# Patient Record
Sex: MALE | Race: OTHER | HISPANIC OR LATINO | URBAN - METROPOLITAN AREA
[De-identification: names, ages, dates, MRNs, and addresses within clinical notes are randomized per-mention and may not be internally consistent; named-entity substitution may affect disease eponyms.]

---

## 2020-11-29 ENCOUNTER — INPATIENT (INPATIENT)
Facility: HOSPITAL | Age: 60
LOS: 4 days | Discharge: HOME | DRG: 381 | End: 2020-12-04
Attending: GENERAL ACUTE CARE HOSPITAL | Admitting: GENERAL ACUTE CARE HOSPITAL
Payer: COMMERCIAL

## 2020-11-29 VITALS
DIASTOLIC BLOOD PRESSURE: 77 MMHG | RESPIRATION RATE: 16 BRPM | SYSTOLIC BLOOD PRESSURE: 116 MMHG | HEART RATE: 62 BPM | TEMPERATURE: 98 F | OXYGEN SATURATION: 96 %

## 2020-11-29 DIAGNOSIS — Z90.49 ACQUIRED ABSENCE OF OTHER SPECIFIED PARTS OF DIGESTIVE TRACT: Chronic | ICD-10-CM

## 2020-11-29 LAB
ALBUMIN SERPL ELPH-MCNC: 4.2 G/DL — SIGNIFICANT CHANGE UP (ref 3.3–5)
ALP SERPL-CCNC: 80 U/L — SIGNIFICANT CHANGE UP (ref 40–120)
ALT FLD-CCNC: 12 U/L — SIGNIFICANT CHANGE UP (ref 10–45)
ANION GAP SERPL CALC-SCNC: 10 MMOL/L — SIGNIFICANT CHANGE UP (ref 5–17)
ANION GAP SERPL CALC-SCNC: 12 MMOL/L — SIGNIFICANT CHANGE UP (ref 5–17)
APPEARANCE UR: CLEAR — SIGNIFICANT CHANGE UP
APTT BLD: 29.9 SEC — SIGNIFICANT CHANGE UP (ref 27.5–35.5)
AST SERPL-CCNC: 14 U/L — SIGNIFICANT CHANGE UP (ref 10–40)
BASE EXCESS BLDV CALC-SCNC: 5 MMOL/L — SIGNIFICANT CHANGE UP
BASOPHILS # BLD AUTO: 0.05 K/UL — SIGNIFICANT CHANGE UP (ref 0–0.2)
BASOPHILS NFR BLD AUTO: 0.4 % — SIGNIFICANT CHANGE UP (ref 0–2)
BILIRUB SERPL-MCNC: 0.7 MG/DL — SIGNIFICANT CHANGE UP (ref 0.2–1.2)
BILIRUB UR-MCNC: NEGATIVE — SIGNIFICANT CHANGE UP
BLD GP AB SCN SERPL QL: NEGATIVE — SIGNIFICANT CHANGE UP
BUN SERPL-MCNC: 7 MG/DL — SIGNIFICANT CHANGE UP (ref 7–23)
BUN SERPL-MCNC: 9 MG/DL — SIGNIFICANT CHANGE UP (ref 7–23)
CA-I SERPL-SCNC: 1.1 MMOL/L — LOW (ref 1.12–1.3)
CALCIUM SERPL-MCNC: 8.1 MG/DL — LOW (ref 8.4–10.5)
CALCIUM SERPL-MCNC: 9.2 MG/DL — SIGNIFICANT CHANGE UP (ref 8.4–10.5)
CHLORIDE SERPL-SCNC: 100 MMOL/L — SIGNIFICANT CHANGE UP (ref 96–108)
CHLORIDE SERPL-SCNC: 96 MMOL/L — SIGNIFICANT CHANGE UP (ref 96–108)
CO2 SERPL-SCNC: 27 MMOL/L — SIGNIFICANT CHANGE UP (ref 22–31)
CO2 SERPL-SCNC: 32 MMOL/L — HIGH (ref 22–31)
COLOR SPEC: YELLOW — SIGNIFICANT CHANGE UP
CREAT SERPL-MCNC: 0.79 MG/DL — SIGNIFICANT CHANGE UP (ref 0.5–1.3)
CREAT SERPL-MCNC: 0.93 MG/DL — SIGNIFICANT CHANGE UP (ref 0.5–1.3)
DIFF PNL FLD: NEGATIVE — SIGNIFICANT CHANGE UP
EOSINOPHIL # BLD AUTO: 0.12 K/UL — SIGNIFICANT CHANGE UP (ref 0–0.5)
EOSINOPHIL NFR BLD AUTO: 0.9 % — SIGNIFICANT CHANGE UP (ref 0–6)
GAS PNL BLDV: 135 MMOL/L — LOW (ref 138–146)
GAS PNL BLDV: SIGNIFICANT CHANGE UP
GLUCOSE SERPL-MCNC: 102 MG/DL — HIGH (ref 70–99)
GLUCOSE SERPL-MCNC: 121 MG/DL — HIGH (ref 70–99)
GLUCOSE UR QL: NEGATIVE — SIGNIFICANT CHANGE UP
HCO3 BLDV-SCNC: 32 MMOL/L — HIGH (ref 20–27)
HCT VFR BLD CALC: 41.7 % — SIGNIFICANT CHANGE UP (ref 39–50)
HCT VFR BLD CALC: 45.8 % — SIGNIFICANT CHANGE UP (ref 39–50)
HGB BLD-MCNC: 13.7 G/DL — SIGNIFICANT CHANGE UP (ref 13–17)
HGB BLD-MCNC: 15.1 G/DL — SIGNIFICANT CHANGE UP (ref 13–17)
IMM GRANULOCYTES NFR BLD AUTO: 0.3 % — SIGNIFICANT CHANGE UP (ref 0–1.5)
INR BLD: 1.21 — HIGH (ref 0.88–1.16)
KETONES UR-MCNC: NEGATIVE — SIGNIFICANT CHANGE UP
LEUKOCYTE ESTERASE UR-ACNC: NEGATIVE — SIGNIFICANT CHANGE UP
LIDOCAIN IGE QN: 13 U/L — SIGNIFICANT CHANGE UP (ref 7–60)
LYMPHOCYTES # BLD AUTO: 1.06 K/UL — SIGNIFICANT CHANGE UP (ref 1–3.3)
LYMPHOCYTES # BLD AUTO: 7.7 % — LOW (ref 13–44)
MAGNESIUM SERPL-MCNC: 1.9 MG/DL — SIGNIFICANT CHANGE UP (ref 1.6–2.6)
MCHC RBC-ENTMCNC: 29.2 PG — SIGNIFICANT CHANGE UP (ref 27–34)
MCHC RBC-ENTMCNC: 29.6 PG — SIGNIFICANT CHANGE UP (ref 27–34)
MCHC RBC-ENTMCNC: 32.9 GM/DL — SIGNIFICANT CHANGE UP (ref 32–36)
MCHC RBC-ENTMCNC: 33 GM/DL — SIGNIFICANT CHANGE UP (ref 32–36)
MCV RBC AUTO: 88.6 FL — SIGNIFICANT CHANGE UP (ref 80–100)
MCV RBC AUTO: 90.1 FL — SIGNIFICANT CHANGE UP (ref 80–100)
MONOCYTES # BLD AUTO: 0.71 K/UL — SIGNIFICANT CHANGE UP (ref 0–0.9)
MONOCYTES NFR BLD AUTO: 5.2 % — SIGNIFICANT CHANGE UP (ref 2–14)
NEUTROPHILS # BLD AUTO: 11.79 K/UL — HIGH (ref 1.8–7.4)
NEUTROPHILS NFR BLD AUTO: 85.5 % — HIGH (ref 43–77)
NITRITE UR-MCNC: NEGATIVE — SIGNIFICANT CHANGE UP
NRBC # BLD: 0 /100 WBCS — SIGNIFICANT CHANGE UP (ref 0–0)
NRBC # BLD: 0 /100 WBCS — SIGNIFICANT CHANGE UP (ref 0–0)
NT-PROBNP SERPL-SCNC: 70 PG/ML — SIGNIFICANT CHANGE UP (ref 0–300)
PCO2 BLDV: 54 MMHG — HIGH (ref 41–51)
PH BLDV: 7.38 — SIGNIFICANT CHANGE UP (ref 7.32–7.43)
PH UR: 6.5 — SIGNIFICANT CHANGE UP (ref 5–8)
PHOSPHATE SERPL-MCNC: 3.6 MG/DL — SIGNIFICANT CHANGE UP (ref 2.5–4.5)
PLATELET # BLD AUTO: 255 K/UL — SIGNIFICANT CHANGE UP (ref 150–400)
PLATELET # BLD AUTO: 284 K/UL — SIGNIFICANT CHANGE UP (ref 150–400)
PO2 BLDV: 24 MMHG — SIGNIFICANT CHANGE UP
POTASSIUM BLDV-SCNC: 3.5 MMOL/L — SIGNIFICANT CHANGE UP (ref 3.5–4.9)
POTASSIUM SERPL-MCNC: 3.4 MMOL/L — LOW (ref 3.5–5.3)
POTASSIUM SERPL-MCNC: 3.9 MMOL/L — SIGNIFICANT CHANGE UP (ref 3.5–5.3)
POTASSIUM SERPL-SCNC: 3.4 MMOL/L — LOW (ref 3.5–5.3)
POTASSIUM SERPL-SCNC: 3.9 MMOL/L — SIGNIFICANT CHANGE UP (ref 3.5–5.3)
PROT SERPL-MCNC: 7.7 G/DL — SIGNIFICANT CHANGE UP (ref 6–8.3)
PROT UR-MCNC: NEGATIVE MG/DL — SIGNIFICANT CHANGE UP
PROTHROM AB SERPL-ACNC: 14.4 SEC — HIGH (ref 10.6–13.6)
RBC # BLD: 4.63 M/UL — SIGNIFICANT CHANGE UP (ref 4.2–5.8)
RBC # BLD: 5.17 M/UL — SIGNIFICANT CHANGE UP (ref 4.2–5.8)
RBC # FLD: 13.2 % — SIGNIFICANT CHANGE UP (ref 10.3–14.5)
RBC # FLD: 13.2 % — SIGNIFICANT CHANGE UP (ref 10.3–14.5)
RH IG SCN BLD-IMP: POSITIVE — SIGNIFICANT CHANGE UP
RH IG SCN BLD-IMP: POSITIVE — SIGNIFICANT CHANGE UP
SAO2 % BLDV: 42 % — SIGNIFICANT CHANGE UP
SARS-COV-2 RNA SPEC QL NAA+PROBE: SIGNIFICANT CHANGE UP
SODIUM SERPL-SCNC: 137 MMOL/L — SIGNIFICANT CHANGE UP (ref 135–145)
SODIUM SERPL-SCNC: 140 MMOL/L — SIGNIFICANT CHANGE UP (ref 135–145)
SP GR SPEC: <=1.005 — SIGNIFICANT CHANGE UP (ref 1–1.03)
TROPONIN T SERPL-MCNC: <0.01 NG/ML — SIGNIFICANT CHANGE UP (ref 0–0.01)
UROBILINOGEN FLD QL: 0.2 E.U./DL — SIGNIFICANT CHANGE UP
WBC # BLD: 12.57 K/UL — HIGH (ref 3.8–10.5)
WBC # BLD: 13.77 K/UL — HIGH (ref 3.8–10.5)
WBC # FLD AUTO: 12.57 K/UL — HIGH (ref 3.8–10.5)
WBC # FLD AUTO: 13.77 K/UL — HIGH (ref 3.8–10.5)

## 2020-11-29 PROCEDURE — 71045 X-RAY EXAM CHEST 1 VIEW: CPT | Mod: 26,76

## 2020-11-29 PROCEDURE — 99222 1ST HOSP IP/OBS MODERATE 55: CPT

## 2020-11-29 PROCEDURE — 74177 CT ABD & PELVIS W/CONTRAST: CPT | Mod: 26

## 2020-11-29 PROCEDURE — 93010 ELECTROCARDIOGRAM REPORT: CPT

## 2020-11-29 PROCEDURE — 71045 X-RAY EXAM CHEST 1 VIEW: CPT | Mod: 26

## 2020-11-29 PROCEDURE — 99285 EMERGENCY DEPT VISIT HI MDM: CPT | Mod: 25

## 2020-11-29 RX ORDER — PIPERACILLIN AND TAZOBACTAM 4; .5 G/20ML; G/20ML
3.38 INJECTION, POWDER, LYOPHILIZED, FOR SOLUTION INTRAVENOUS EVERY 6 HOURS
Refills: 0 | Status: DISCONTINUED | OUTPATIENT
Start: 2020-11-29 | End: 2020-12-02

## 2020-11-29 RX ORDER — IOHEXOL 300 MG/ML
30 INJECTION, SOLUTION INTRAVENOUS ONCE
Refills: 0 | Status: COMPLETED | OUTPATIENT
Start: 2020-11-29 | End: 2020-11-29

## 2020-11-29 RX ORDER — FLUCONAZOLE 150 MG/1
400 TABLET ORAL EVERY 24 HOURS
Refills: 0 | Status: DISCONTINUED | OUTPATIENT
Start: 2020-11-30 | End: 2020-12-03

## 2020-11-29 RX ORDER — IPRATROPIUM/ALBUTEROL SULFATE 18-103MCG
3 AEROSOL WITH ADAPTER (GRAM) INHALATION EVERY 6 HOURS
Refills: 0 | Status: DISCONTINUED | OUTPATIENT
Start: 2020-11-29 | End: 2020-12-04

## 2020-11-29 RX ORDER — ERYTHROMYCIN BASE 5 MG/GRAM
1 OINTMENT (GRAM) OPHTHALMIC (EYE) EVERY 8 HOURS
Refills: 0 | Status: DISCONTINUED | OUTPATIENT
Start: 2020-11-29 | End: 2020-12-04

## 2020-11-29 RX ORDER — METRONIDAZOLE 500 MG
500 TABLET ORAL ONCE
Refills: 0 | Status: COMPLETED | OUTPATIENT
Start: 2020-11-29 | End: 2020-11-29

## 2020-11-29 RX ORDER — FAMOTIDINE 10 MG/ML
20 INJECTION INTRAVENOUS ONCE
Refills: 0 | Status: COMPLETED | OUTPATIENT
Start: 2020-11-29 | End: 2020-11-29

## 2020-11-29 RX ORDER — DEXTROSE MONOHYDRATE, SODIUM CHLORIDE, AND POTASSIUM CHLORIDE 50; .745; 4.5 G/1000ML; G/1000ML; G/1000ML
1000 INJECTION, SOLUTION INTRAVENOUS
Refills: 0 | Status: DISCONTINUED | OUTPATIENT
Start: 2020-11-29 | End: 2020-11-30

## 2020-11-29 RX ORDER — PIPERACILLIN AND TAZOBACTAM 4; .5 G/20ML; G/20ML
3.38 INJECTION, POWDER, LYOPHILIZED, FOR SOLUTION INTRAVENOUS ONCE
Refills: 0 | Status: COMPLETED | OUTPATIENT
Start: 2020-11-29 | End: 2020-11-29

## 2020-11-29 RX ORDER — MORPHINE SULFATE 50 MG/1
2 CAPSULE, EXTENDED RELEASE ORAL ONCE
Refills: 0 | Status: DISCONTINUED | OUTPATIENT
Start: 2020-11-29 | End: 2020-11-29

## 2020-11-29 RX ORDER — CEFTRIAXONE 500 MG/1
1000 INJECTION, POWDER, FOR SOLUTION INTRAMUSCULAR; INTRAVENOUS ONCE
Refills: 0 | Status: COMPLETED | OUTPATIENT
Start: 2020-11-29 | End: 2020-11-29

## 2020-11-29 RX ORDER — SODIUM CHLORIDE 9 MG/ML
1000 INJECTION INTRAMUSCULAR; INTRAVENOUS; SUBCUTANEOUS ONCE
Refills: 0 | Status: COMPLETED | OUTPATIENT
Start: 2020-11-29 | End: 2020-11-29

## 2020-11-29 RX ORDER — FLUCONAZOLE 150 MG/1
400 TABLET ORAL ONCE
Refills: 0 | Status: COMPLETED | OUTPATIENT
Start: 2020-11-29 | End: 2020-11-29

## 2020-11-29 RX ORDER — PANTOPRAZOLE SODIUM 20 MG/1
40 TABLET, DELAYED RELEASE ORAL EVERY 12 HOURS
Refills: 0 | Status: DISCONTINUED | OUTPATIENT
Start: 2020-11-29 | End: 2020-12-03

## 2020-11-29 RX ORDER — METRONIDAZOLE 500 MG
500 TABLET ORAL EVERY 8 HOURS
Refills: 0 | Status: DISCONTINUED | OUTPATIENT
Start: 2020-11-29 | End: 2020-12-02

## 2020-11-29 RX ADMIN — CEFTRIAXONE 100 MILLIGRAM(S): 500 INJECTION, POWDER, FOR SOLUTION INTRAMUSCULAR; INTRAVENOUS at 05:44

## 2020-11-29 RX ADMIN — Medication 100 MILLIGRAM(S): at 21:52

## 2020-11-29 RX ADMIN — Medication 100 MILLIGRAM(S): at 13:59

## 2020-11-29 RX ADMIN — SODIUM CHLORIDE 1000 MILLILITER(S): 9 INJECTION INTRAMUSCULAR; INTRAVENOUS; SUBCUTANEOUS at 00:52

## 2020-11-29 RX ADMIN — Medication 100 MILLIGRAM(S): at 06:59

## 2020-11-29 RX ADMIN — IOHEXOL 30 MILLILITER(S): 300 INJECTION, SOLUTION INTRAVENOUS at 00:53

## 2020-11-29 RX ADMIN — PANTOPRAZOLE SODIUM 40 MILLIGRAM(S): 20 TABLET, DELAYED RELEASE ORAL at 17:33

## 2020-11-29 RX ADMIN — FAMOTIDINE 20 MILLIGRAM(S): 10 INJECTION INTRAVENOUS at 00:53

## 2020-11-29 RX ADMIN — FLUCONAZOLE 100 MILLIGRAM(S): 150 TABLET ORAL at 06:58

## 2020-11-29 RX ADMIN — PIPERACILLIN AND TAZOBACTAM 200 GRAM(S): 4; .5 INJECTION, POWDER, LYOPHILIZED, FOR SOLUTION INTRAVENOUS at 23:37

## 2020-11-29 RX ADMIN — PIPERACILLIN AND TAZOBACTAM 200 GRAM(S): 4; .5 INJECTION, POWDER, LYOPHILIZED, FOR SOLUTION INTRAVENOUS at 17:14

## 2020-11-29 RX ADMIN — Medication 1 APPLICATION(S): at 21:52

## 2020-11-29 NOTE — CONSULT NOTE ADULT - ASSESSMENT
60M w/ no pMHx presents w/ perforated gastric ulcer    Perforated ulcer: C/w antibiotics for intra-abdominal infection.  No evidence of sepsis at this time.  PPI.  NPO, gastric suction.    Smoker: nicotine patch PRN 60M w/ no pMHx presents w/ perforated gastric ulcer    Perforated ulcer: C/w antibiotics for intra-abdominal infection.  No evidence of sepsis at this time.  PPI.  NPO, gastric suction.    Pre-op clearance: METS > 4 (patient works as a ).  RCRI 1 (intra-peritoneal procedure).  No hx of CVA/MI/DM.  Cr < 2.  Not on medications but has hx of smoking.  CXR clear, EKG appears normal.  No cardio/resp limitations.  Medically optimized for intra-peritoneal procedure.    Smoker: nicotine patch PRN 60M w/ no pMHx presents w/ perforated gastric ulcer    Perforated ulcer: C/w antibiotics for intra-abdominal infection.  No evidence of sepsis at this time.  PPI.  NPO, gastric suction.    Pre-op clearance: METS > 4 (patient works as a ).  RCRI 1 (intra-peritoneal procedure).  No hx of CVA/MI/DM.  Cr < 2.  Please check hba1c and lipid profile to further assess risk.  Not on medications but has hx of smoking.  CXR clear, EKG appears normal.  No cardio/resp limitations.  Medically optimized for intra-peritoneal procedure.      Smoker: nicotine patch PRN

## 2020-11-29 NOTE — ED ADULT NURSE NOTE - NSIMPLEMENTINTERV_GEN_ALL_ED
Implemented All Universal Safety Interventions:  [place] to call system. Call bell, personal items and telephone within reach. Instruct patient to call for assistance. Room bathroom lighting operational. Non-slip footwear when patient is off stretcher. Physically safe environment: no spills, clutter or unnecessary equipment. Stretcher in lowest position, wheels locked, appropriate side rails in place.
(3) no apparent problem

## 2020-11-29 NOTE — PROVIDER CONTACT NOTE (CHANGE IN STATUS NOTIFICATION) - ASSESSMENT
Currently asleep. When waken up pt does not report lightheadedness, dizziness, or complaints of SOB  VSS, refer to flowsheet

## 2020-11-29 NOTE — ED PROVIDER NOTE - PROGRESS NOTE DETAILS
perforated gastric ulcer on CT - surgery consulted accepted to surgical service - recommend ceftriaxone, flagyl and fluconazole

## 2020-11-29 NOTE — ED PROVIDER NOTE - OBJECTIVE STATEMENT
60M no PMH c/o upper abd pain. pt states pain radiates into chest. states pain ongoing for past 5 days. no bowel movement for past 5 days. no vomiting. no fevers. no SOB. no recent travel. no LE swelling. no sick contacts.  pt states pain worse with eating. upon EMS arrival pt hypoxic, however pt 95% on RA in ED.

## 2020-11-29 NOTE — PROGRESS NOTE ADULT - SUBJECTIVE AND OBJECTIVE BOX
Serial Abdominal Exam @ 19:23    S: Pt complains of mild abdominal pain. Denies any increase in pain. Denies N/V. Last BM this AM, stool antigen testing pending next BM. Denies CP, SOB, calf tenderness. Pt just ambulated to bathroom and reports that he has been ambulating today.    O:  T(C): 36.9 (11-29-20 @ 17:42), Max: 36.9 (11-29-20 @ 17:42)  T(F): 98.4 (11-29-20 @ 17:42), Max: 98.4 (11-29-20 @ 17:42)  HR: 56 (11-29-20 @ 17:00) (56 - 56)  BP: 109/62 (11-29-20 @ 17:00) (109/62 - 109/62)  RR: 16 (11-29-20 @ 17:00) (16 - 16)  SpO2: 94% (11-29-20 @ 17:00) (94% - 94%)  Wt(kg): --                        13.7   12.57 )-----------( 255      ( 29 Nov 2020 06:23 )             41.7     11-29    137  |  100  |  7   ----------------------------<  102<H>  3.9   |  27  |  0.79    Ca    8.1<L>      29 Nov 2020 06:23  Phos  3.6     11-29  Mg     1.9     11-29    TPro  7.7  /  Alb  4.2  /  TBili  0.7  /  DBili  x   /  AST  14  /  ALT  12  /  AlkPhos  80  11-29      Gen: NAD, resting comfortably in bed  C/V: pulses present and strong in the b/l upper extremities   Pulm: Nonlabored breathing, no respiratory distress  Abd: soft, nondistended, mildly tender mainly in LUQ and epigastrum  Extrem: WWP, no calf edema, SCDs    Will continue to monitor.    Serial Abdominal Exam @ 19:23    S: Pt complains of mild abdominal pain. Denies any increase in pain. Denies N/V. Last BM this AM, stool antigen testing pending next BM. Denies CP, SOB, calf tenderness. Pt just ambulated to bathroom and reports that he has been ambulating today. NGT in place w dark output, small amounts of clot.    O:  T(C): 36.9 (11-29-20 @ 17:42), Max: 36.9 (11-29-20 @ 17:42)  T(F): 98.4 (11-29-20 @ 17:42), Max: 98.4 (11-29-20 @ 17:42)  HR: 56 (11-29-20 @ 17:00) (56 - 56)  BP: 109/62 (11-29-20 @ 17:00) (109/62 - 109/62)  RR: 16 (11-29-20 @ 17:00) (16 - 16)  SpO2: 94% (11-29-20 @ 17:00) (94% - 94%)  Wt(kg): --                        13.7   12.57 )-----------( 255      ( 29 Nov 2020 06:23 )             41.7     11-29    137  |  100  |  7   ----------------------------<  102<H>  3.9   |  27  |  0.79    Ca    8.1<L>      29 Nov 2020 06:23  Phos  3.6     11-29  Mg     1.9     11-29    TPro  7.7  /  Alb  4.2  /  TBili  0.7  /  DBili  x   /  AST  14  /  ALT  12  /  AlkPhos  80  11-29      Gen: NAD, resting comfortably in bed, NGT w dark output, small amounts of clot  C/V: pulses present and strong in the b/l upper extremities   Pulm: Nonlabored breathing, no respiratory distress  Abd: soft, nondistended, mildly tender mainly in LUQ and epigastrum  Extrem: WWP, no calf edema, SCDs    Will continue to monitor.

## 2020-11-29 NOTE — H&P ADULT - ASSESSMENT
A/P: 59 yo M no PMH/PSH, extensive smoking history presents with contained gastric perforation.     Admit to General Surgery Team 4 Telemetry Dr. Polanco  NPO/IVF  Pain/Nausea control PRN  Ceftriaxone/Flagyl/Fluconazole  AM labs  Plan discussed with chief resident and attending A/P: 61 yo M no PMH/PSH, extensive smoking history presents with contained gastric perforation.     Admit to General Surgery Team 4 Telemetry Dr. Polanco  NPO/IVF  Pain/Nausea control PRN  Ceftriaxone/Flagyl/Fluconazole  AM labs  Plan discussed with chief resident and attending      General Surgery Senior Resident Note  61 yo smoker, h/o prostate cancer, diagnosed with gastric ulcer on recent EGD, presents with epigastric pain x 5 days. Hemodynamically stable. on exam, tender to RUQ, LUQ, epigastrium. Leukocytosis w/ WBC 13. CT concerning for perforation w/ defect on lesser curvature, contained perf in lesser sac, no widespread pneumoperitoneum (4 cm air and debris filled thick walled structure from 1.4 cm defect along lesser curvature of stomach w/ fat infiltration). CT also shows questionable severe narrowing versus occlusion of the proximal SMA branch (series 4 image 31, series 3 image 33) but with immediate reconstitution distally. For contained gastric perforation- plan for NPO for bowel rest, IVF, PPI, IV abx, admit to telemetry, serial abdominal exams.

## 2020-11-29 NOTE — CONSULT NOTE ADULT - SUBJECTIVE AND OBJECTIVE BOX
Patient is a 60y old  Male who presents with a chief complaint of gastric perforation (29 Nov 2020 05:15)    CC/HPI reviewed by me.  Patient reports no medical problems.  +smoker.  No medications at home.  Occasional EtOH, no illicit drug use.  Denies use of NSAIDS.      INTERVAL HPI/OVERNIGHT EVENTS:  [place]      REVIEW OF SYSTEMS:  CONSTITUTIONAL: No fever, weight loss, or fatigue  EYES: No eye pain, visual disturbances, or discharge  ENMT:  No difficulty hearing, tinnitus, vertigo; No sinus or throat pain  NECK: No pain or stiffness  BREASTS: No pain, masses, or nipple discharge  RESPIRATORY: No cough, wheezing, chills or hemoptysis; No shortness of breath  CARDIOVASCULAR: No chest pain, palpitations, dizziness, or leg swelling  GASTROINTESTINAL: +abdominal pain.  GENITOURINARY: No dysuria, frequency, hematuria, or incontinence  NEUROLOGICAL: No headaches, memory loss, loss of strength, numbness, or tremors  SKIN: Genetic/hereditary skin changes  MUSCULOSKELETAL: No joint pain or swelling; No muscle, back, or extremity pain    T(C): 37.2 (11-29-20 @ 09:17), Max: 37.2 (11-29-20 @ 09:17)  HR: 58 (11-29-20 @ 08:15) (58 - 68)  BP: 95/59 (11-29-20 @ 08:15) (95/59 - 116/77)  RR: 18 (11-29-20 @ 08:15) (16 - 19)  SpO2: 92% (11-29-20 @ 08:15) (92% - 100%)  Wt(kg): --Vital Signs Last 24 Hrs  T(C): 37.2 (29 Nov 2020 09:17), Max: 37.2 (29 Nov 2020 09:17)  T(F): 98.9 (29 Nov 2020 09:17), Max: 98.9 (29 Nov 2020 09:17)  HR: 58 (29 Nov 2020 08:15) (58 - 68)  BP: 95/59 (29 Nov 2020 08:15) (95/59 - 116/77)  BP(mean): 73 (29 Nov 2020 08:15) (73 - 77)  RR: 18 (29 Nov 2020 08:15) (16 - 19)  SpO2: 92% (29 Nov 2020 08:15) (92% - 100%)    PHYSICAL EXAM:  GENERAL: NAD, well-groomed, well-developed  HEAD:  Atraumatic, Normocephalic  EYES: EOMI, PERRLA, conjunctiva and sclera clear  ENMT: No tonsillar erythema, exudates, or enlargement; Moist mucous membranes, Good dentition, No lesions  NECK: Supple, No JVD, Normal thyroid  NERVOUS SYSTEM:  Alert & Oriented X3, Good concentration; Motor Strength 5/5 B/L upper and lower extremities; DTRs 2+ intact and symmetric  CHEST/LUNG: Clear to percussion bilaterally; No rales, rhonchi, wheezing, or rubs  HEART: Regular rate and rhythm; No murmurs, rubs, or gallops  ABDOMEN: Soft, mild tenderness at epigastrum, Nondistended; Bowel sounds present; NG tube in place  EXTREMITIES:  2+ Peripheral Pulses, No clubbing, cyanosis, or edema  LYMPH: No lymphadenopathy noted  SKIN: No rashes or lesions    Consultant(s) Notes Reviewed:  [x ] YES  [ ] NO  Care Discussed with Consultants/Other Providers [ x] YES  [ ] NO    LABS:                        13.7   12.57 )-----------( 255      ( 29 Nov 2020 06:23 )             41.7     11-29    137  |  100  |  7   ----------------------------<  102<H>  3.9   |  27  |  0.79    Ca    8.1<L>      29 Nov 2020 06:23  Phos  3.6     11-29  Mg     1.9     11-29    TPro  7.7  /  Alb  4.2  /  TBili  0.7  /  DBili  x   /  AST  14  /  ALT  12  /  AlkPhos  80  11-29    PT/INR - ( 29 Nov 2020 00:49 )   PT: 14.4 sec;   INR: 1.21          PTT - ( 29 Nov 2020 00:49 )  PTT:29.9 sec  Urinalysis Basic - ( 29 Nov 2020 04:46 )    Color: Yellow / Appearance: Clear / SG: <=1.005 / pH: x  Gluc: x / Ketone: NEGATIVE  / Bili: Negative / Urobili: 0.2 E.U./dL   Blood: x / Protein: NEGATIVE mg/dL / Nitrite: NEGATIVE   Leuk Esterase: NEGATIVE / RBC: x / WBC x   Sq Epi: x / Non Sq Epi: x / Bacteria: x      CAPILLARY BLOOD GLUCOSE            Urinalysis Basic - ( 29 Nov 2020 04:46 )    Color: Yellow / Appearance: Clear / SG: <=1.005 / pH: x  Gluc: x / Ketone: NEGATIVE  / Bili: Negative / Urobili: 0.2 E.U./dL   Blood: x / Protein: NEGATIVE mg/dL / Nitrite: NEGATIVE   Leuk Esterase: NEGATIVE / RBC: x / WBC x   Sq Epi: x / Non Sq Epi: x / Bacteria: x        RADIOLOGY & ADDITIONAL TESTS:    Imaging Personally Reviewed:  [ ] YES  [ ] NO

## 2020-11-29 NOTE — ED ADULT TRIAGE NOTE - OTHER COMPLAINTS
pt BIBA from home for abd and midsternal chest pain x5 days, denies any n/v/d, SOB or fevers, reports chronic cough due to smoking that has not changed recently, per EMS initial spo2 was 88% on RA, pt reports CP is constant and stabbing, does not radiate, worsens when eating, does not worsen with activity, pt denies any medical hx also denies any medications

## 2020-11-29 NOTE — ED PROVIDER NOTE - CLINICAL SUMMARY MEDICAL DECISION MAKING FREE TEXT BOX
abd pain radiating into chest, no SOB, no resp distress, lungs clear, speaking in full sentences, no nausea, diffuse pain wtih palpation  -check labs, ekg  -cxr  -CT a/p  -ivf, pepcid, morphine

## 2020-11-29 NOTE — H&P ADULT - NSICDXPASTMEDICALHX_GEN_ALL_CORE_FT
PAST MEDICAL HISTORY:  Prostate cancer      I will START or STAY ON the medications listed below when I get home from the hospital:  None

## 2020-11-29 NOTE — H&P ADULT - HISTORY OF PRESENT ILLNESS
This is a 61 yo M with no PMH, extensive smoking hx (44 year 1PPD), no PSH who presents with 5 day history of acute onset sharp abdominal pain. Pt states that 5 days ago he had a stabbing sensation in epigastric region that radiated to his chest. He states the pain remained constant, without any alleviation. He denies any associated symptoms including nausea, vomiting, sob, fever, chills, cough, URI symptoms. He denies any NSAID use. Of note, pt underwent EGD/colonoscopy ~3 weeks ago that was significant for gastric ulcer that was not bleeding or perforated. He states he was not put on any medication for the ulcer. Colonoscopy was negative.     In ED, pt is afebrile, HDS. Labs are significant for WBC 13.77. CT scan shows [id number] debris and air-filled thick-walled structure arising from 1.4x1.1cm wall defect along lesser curvature, no extravasation of PO contrast, no pneumoperitoneum.

## 2020-11-29 NOTE — H&P ADULT - NSHPLABSRESULTS_GEN_ALL_CORE
15.1   13.77 )-----------( 284      ( 29 Nov 2020 00:49 )             45.8   11-29    140  |  96  |  9   ----------------------------<  121<H>  3.4<L>   |  32<H>  |  0.93    Ca    9.2      29 Nov 2020 00:49    TPro  7.7  /  Alb  4.2  /  TBili  0.7  /  DBili  x   /  AST  14  /  ALT  12  /  AlkPhos  80  11-29  < from: CT Abdomen and Pelvis w/ Oral Cont and w/ IV Cont (11.29.20 @ 03:00) >    EXAM:  CT ABDOMEN AND PELVIS OC IC                          PROCEDURE DATE:  11/29/2020          INTERPRETATION:  CT SCAN OF ABDOMEN AND PELVIS    History: Upper abdominal pain.    Technique: CT scan of abdomen and pelvis was performed from lung bases through symphysis pubis. Axial, sagittal and coronal reformatted images were reviewed.    Contrast:  IV contrast: Optiray 350: 90 ml administered.  Oral contrast: administered.    Comparison: None.    Findings:    Lower chest: Right lower lobe bronchial wall thickening with mucus plugging in the medial lobe and subsegmental atelectasis distally, consistent with small airways disease. Linear atelectasis versus scarring in the right middle lobe.    Liver:  Normal.    Gallbladder: No radiopaque stones.    Spleen:  Normal.    Pancreas:  Normal.    Adrenal glands:  Normal.    Kidneys: No hydronephrosis or nephrolithiasis. Subcentimeter hypodensity in the left mid kidney is too small to characterize.    Adenopathy:  No lymphadenopathy in abdomenor pelvis.    Ascites: Trace scattered ascites.    Gastrointestinal tract: 4 x 3 x 3 cm debris and air-filled thick-walled structure arising from a 1.4 x 1.1 cm wall defect along the lesser curvature of the stomach with surrounding fat infiltration. There is associated submucosal edema along the lesser curvature of the stomach. No pneumoperitoneum or extravasation of enteric contrast. No bowel obstruction. Normal appendix. Moderate colonic stool burden. 17 mm radiopaque linear structure in the distal sigmoid colon may represent a polypectomy clip.    Vessels: Moderate noncalcified and calcified plaque of the infrarenal abdominal aorta. Apparent single celiac-mesenteric trunk with a small caliber SMA arising inferiorly. Questionable severe narrowing versus occlusion of the proximal SMA branch (series 4 image 31, series 3 image 33) but with immediate reconstitution distally.    Pelvic organs: Distended bladder. Mild prostatomegaly.    Soft tissues: Mild anasarca.    Bones: Mild degenerativechanges of the spine.    Impression:  4 cm air and debris-filled thick-walled structure arises from a 1.4 cm defect along the lesser curvature of the stomach with surrounding fat infiltration is consistent with a contained perforation, probably related to gastric ulcer. Trace scattered ascites. No organized collections or free air within the peritoneum.      < end of copied text >

## 2020-11-29 NOTE — H&P ADULT - NSHPPHYSICALEXAM_GEN_ALL_CORE
Vital Signs Last 24 Hrs  T(C): 36.6 (29 Nov 2020 00:09), Max: 36.6 (29 Nov 2020 00:09)  T(F): 97.9 (29 Nov 2020 00:09), Max: 97.9 (29 Nov 2020 00:09)  HR: 62 (29 Nov 2020 00:09) (62 - 62)  BP: 116/77 (29 Nov 2020 00:09) (116/77 - 116/77)  BP(mean): --  RR: 18 (29 Nov 2020 00:40) (16 - 18)  SpO2: 95% (29 Nov 2020 00:40) (95% - 100%)    PE  GEN: NAD, resting comfortably  HEENT: MMM  CV: NSR, reg s1s2  Resp: CTA b/l no W/R/R  Abd: soft, tender to palpation diffusely, no rebound, +involuntary guarding, no distension  Ext: WWP, no calf tenderness or edema. Multiple dark skin lesions evenly distributed on extremities, trunk and face/scalp.   Neuro: no focal deficits

## 2020-11-30 DIAGNOSIS — R07.89 OTHER CHEST PAIN: ICD-10-CM

## 2020-11-30 DIAGNOSIS — N18.2 CHRONIC KIDNEY DISEASE, STAGE 2 (MILD): ICD-10-CM

## 2020-11-30 DIAGNOSIS — D72.829 ELEVATED WHITE BLOOD CELL COUNT, UNSPECIFIED: ICD-10-CM

## 2020-11-30 DIAGNOSIS — K55.1 CHRONIC VASCULAR DISORDERS OF INTESTINE: ICD-10-CM

## 2020-11-30 DIAGNOSIS — R00.1 BRADYCARDIA, UNSPECIFIED: ICD-10-CM

## 2020-11-30 DIAGNOSIS — K25.1 ACUTE GASTRIC ULCER WITH PERFORATION: ICD-10-CM

## 2020-11-30 DIAGNOSIS — F17.210 NICOTINE DEPENDENCE, CIGARETTES, UNCOMPLICATED: ICD-10-CM

## 2020-11-30 LAB
A1C WITH ESTIMATED AVERAGE GLUCOSE RESULT: 5.4 % — SIGNIFICANT CHANGE UP (ref 4–5.6)
ANION GAP SERPL CALC-SCNC: 7 MMOL/L — SIGNIFICANT CHANGE UP (ref 5–17)
BUN SERPL-MCNC: 9 MG/DL — SIGNIFICANT CHANGE UP (ref 7–23)
CALCIUM SERPL-MCNC: 8.6 MG/DL — SIGNIFICANT CHANGE UP (ref 8.4–10.5)
CHLORIDE SERPL-SCNC: 107 MMOL/L — SIGNIFICANT CHANGE UP (ref 96–108)
CHOLEST SERPL-MCNC: 110 MG/DL — SIGNIFICANT CHANGE UP
CO2 SERPL-SCNC: 28 MMOL/L — SIGNIFICANT CHANGE UP (ref 22–31)
CREAT SERPL-MCNC: 1.03 MG/DL — SIGNIFICANT CHANGE UP (ref 0.5–1.3)
CULTURE RESULTS: SIGNIFICANT CHANGE UP
ESTIMATED AVERAGE GLUCOSE: 108 MG/DL — SIGNIFICANT CHANGE UP (ref 68–114)
GLUCOSE SERPL-MCNC: 106 MG/DL — HIGH (ref 70–99)
HCT VFR BLD CALC: 40.5 % — SIGNIFICANT CHANGE UP (ref 39–50)
HCV AB S/CO SERPL IA: 0.06 S/CO — SIGNIFICANT CHANGE UP
HCV AB SERPL-IMP: SIGNIFICANT CHANGE UP
HDLC SERPL-MCNC: 35 MG/DL — LOW
HGB BLD-MCNC: 13.2 G/DL — SIGNIFICANT CHANGE UP (ref 13–17)
LIPID PNL WITH DIRECT LDL SERPL: 60 MG/DL — SIGNIFICANT CHANGE UP
MAGNESIUM SERPL-MCNC: 2 MG/DL — SIGNIFICANT CHANGE UP (ref 1.6–2.6)
MCHC RBC-ENTMCNC: 29.1 PG — SIGNIFICANT CHANGE UP (ref 27–34)
MCHC RBC-ENTMCNC: 32.6 GM/DL — SIGNIFICANT CHANGE UP (ref 32–36)
MCV RBC AUTO: 89.4 FL — SIGNIFICANT CHANGE UP (ref 80–100)
NON HDL CHOLESTEROL: 75 MG/DL — SIGNIFICANT CHANGE UP
NRBC # BLD: 0 /100 WBCS — SIGNIFICANT CHANGE UP (ref 0–0)
PHOSPHATE SERPL-MCNC: 3.5 MG/DL — SIGNIFICANT CHANGE UP (ref 2.5–4.5)
PLATELET # BLD AUTO: 269 K/UL — SIGNIFICANT CHANGE UP (ref 150–400)
POTASSIUM SERPL-MCNC: 4 MMOL/L — SIGNIFICANT CHANGE UP (ref 3.5–5.3)
POTASSIUM SERPL-SCNC: 4 MMOL/L — SIGNIFICANT CHANGE UP (ref 3.5–5.3)
RBC # BLD: 4.53 M/UL — SIGNIFICANT CHANGE UP (ref 4.2–5.8)
RBC # FLD: 13.5 % — SIGNIFICANT CHANGE UP (ref 10.3–14.5)
SODIUM SERPL-SCNC: 142 MMOL/L — SIGNIFICANT CHANGE UP (ref 135–145)
SPECIMEN SOURCE: SIGNIFICANT CHANGE UP
TRIGL SERPL-MCNC: 76 MG/DL — SIGNIFICANT CHANGE UP
WBC # BLD: 9.52 K/UL — SIGNIFICANT CHANGE UP (ref 3.8–10.5)
WBC # FLD AUTO: 9.52 K/UL — SIGNIFICANT CHANGE UP (ref 3.8–10.5)

## 2020-11-30 PROCEDURE — 99233 SBSQ HOSP IP/OBS HIGH 50: CPT | Mod: GC

## 2020-11-30 PROCEDURE — 99232 SBSQ HOSP IP/OBS MODERATE 35: CPT

## 2020-11-30 PROCEDURE — 99406 BEHAV CHNG SMOKING 3-10 MIN: CPT | Mod: GC

## 2020-11-30 RX ORDER — INFLUENZA VIRUS VACCINE 15; 15; 15; 15 UG/.5ML; UG/.5ML; UG/.5ML; UG/.5ML
0.5 SUSPENSION INTRAMUSCULAR ONCE
Refills: 0 | Status: DISCONTINUED | OUTPATIENT
Start: 2020-11-30 | End: 2020-12-04

## 2020-11-30 RX ORDER — SODIUM CHLORIDE 9 MG/ML
1000 INJECTION, SOLUTION INTRAVENOUS
Refills: 0 | Status: DISCONTINUED | OUTPATIENT
Start: 2020-11-30 | End: 2020-12-03

## 2020-11-30 RX ORDER — NICOTINE POLACRILEX 2 MG
1 GUM BUCCAL DAILY
Refills: 0 | Status: DISCONTINUED | OUTPATIENT
Start: 2020-11-30 | End: 2020-12-04

## 2020-11-30 RX ORDER — SODIUM CHLORIDE 9 MG/ML
1000 INJECTION, SOLUTION INTRAVENOUS ONCE
Refills: 0 | Status: COMPLETED | OUTPATIENT
Start: 2020-11-30 | End: 2020-11-30

## 2020-11-30 RX ADMIN — PIPERACILLIN AND TAZOBACTAM 200 GRAM(S): 4; .5 INJECTION, POWDER, LYOPHILIZED, FOR SOLUTION INTRAVENOUS at 23:39

## 2020-11-30 RX ADMIN — Medication 100 MILLIGRAM(S): at 13:02

## 2020-11-30 RX ADMIN — Medication 1 PATCH: at 18:11

## 2020-11-30 RX ADMIN — Medication 1 APPLICATION(S): at 21:58

## 2020-11-30 RX ADMIN — Medication 1 APPLICATION(S): at 15:24

## 2020-11-30 RX ADMIN — PIPERACILLIN AND TAZOBACTAM 200 GRAM(S): 4; .5 INJECTION, POWDER, LYOPHILIZED, FOR SOLUTION INTRAVENOUS at 05:14

## 2020-11-30 RX ADMIN — Medication 1 PATCH: at 13:02

## 2020-11-30 RX ADMIN — Medication 1 APPLICATION(S): at 05:15

## 2020-11-30 RX ADMIN — SODIUM CHLORIDE 250 MILLILITER(S): 9 INJECTION, SOLUTION INTRAVENOUS at 11:39

## 2020-11-30 RX ADMIN — FLUCONAZOLE 100 MILLIGRAM(S): 150 TABLET ORAL at 05:15

## 2020-11-30 RX ADMIN — Medication 100 MILLIGRAM(S): at 05:14

## 2020-11-30 RX ADMIN — PIPERACILLIN AND TAZOBACTAM 200 GRAM(S): 4; .5 INJECTION, POWDER, LYOPHILIZED, FOR SOLUTION INTRAVENOUS at 11:38

## 2020-11-30 RX ADMIN — Medication 100 MILLIGRAM(S): at 21:58

## 2020-11-30 RX ADMIN — PIPERACILLIN AND TAZOBACTAM 200 GRAM(S): 4; .5 INJECTION, POWDER, LYOPHILIZED, FOR SOLUTION INTRAVENOUS at 18:11

## 2020-11-30 RX ADMIN — PANTOPRAZOLE SODIUM 40 MILLIGRAM(S): 20 TABLET, DELAYED RELEASE ORAL at 05:15

## 2020-11-30 RX ADMIN — PANTOPRAZOLE SODIUM 40 MILLIGRAM(S): 20 TABLET, DELAYED RELEASE ORAL at 18:11

## 2020-11-30 NOTE — PROGRESS NOTE ADULT - SUBJECTIVE AND OBJECTIVE BOX
INTERVAL HPI/OVERNIGHT EVENTS: None    Subjective:  Pt has no acute complaints. Reports pain is improved. Denies N/V. Reports some chest pressure, thinks it is a/w NGT. 12 pt ROS is otherwise negative.     VITAL SIGNS:  T(F): 98.3 (11-30-20 @ 09:10)  HR: 46 (11-30-20 @ 08:18)  BP: 108/66 (11-30-20 @ 08:18)  RR: 17 (11-30-20 @ 08:18)  SpO2: 94% (11-30-20 @ 08:18)  Wt(kg): --    PHYSICAL EXAM:    Constitutional: NAD  Eyes: PERRLA  ENMT: NGT  Neck: supple  Respiratory: decreased BS at bases  Cardiovascular: bradycardic, s1s2, no m/r/g  Gastrointestinal: soft, mild TTP  Extremities: wwp  Vascular: + 2 pulses radial  Neurological: AAO x 4  Musculoskeletal: no joint swelling  Skin: no rash  Psych: normal affect    MEDICATIONS  (STANDING):  dextrose 5% + lactated ringers. 1000 milliLiter(s) (120 mL/Hr) IV Continuous <Continuous>  erythromycin   Ointment 1 Application(s) Both EYES every 8 hours  fluconAZOLE IVPB 400 milliGRAM(s) IV Intermittent every 24 hours  influenza   Vaccine 0.5 milliLiter(s) IntraMuscular once  metroNIDAZOLE  IVPB 500 milliGRAM(s) IV Intermittent every 8 hours  nicotine -  14 mG/24Hr(s) Patch 1 patch Transdermal daily  pantoprazole  Injectable 40 milliGRAM(s) IV Push every 12 hours  piperacillin/tazobactam IVPB.. 3.375 Gram(s) IV Intermittent every 6 hours    MEDICATIONS  (PRN):  albuterol/ipratropium for Nebulization 3 milliLiter(s) Nebulizer every 6 hours PRN Shortness of Breath and/or Wheezing      Allergies    No Known Allergies    Intolerances        LABS:                        13.2   9.52  )-----------( 269      ( 30 Nov 2020 05:52 )             40.5     11-30    142  |  107  |  9   ----------------------------<  106<H>  4.0   |  28  |  1.03    Ca    8.6      30 Nov 2020 05:52  Phos  3.5     11-30  Mg     2.0     11-30    TPro  7.7  /  Alb  4.2  /  TBili  0.7  /  DBili  x   /  AST  14  /  ALT  12  /  AlkPhos  80  11-29    PT/INR - ( 29 Nov 2020 00:49 )   PT: 14.4 sec;   INR: 1.21          PTT - ( 29 Nov 2020 00:49 )  PTT:29.9 sec  Urinalysis Basic - ( 29 Nov 2020 04:46 )    Color: Yellow / Appearance: Clear / SG: <=1.005 / pH: x  Gluc: x / Ketone: NEGATIVE  / Bili: Negative / Urobili: 0.2 E.U./dL   Blood: x / Protein: NEGATIVE mg/dL / Nitrite: NEGATIVE   Leuk Esterase: NEGATIVE / RBC: x / WBC x   Sq Epi: x / Non Sq Epi: x / Bacteria: x        RADIOLOGY & ADDITIONAL TESTS:  < from: CT Abdomen and Pelvis w/ Oral Cont and w/ IV Cont (11.29.20 @ 03:00) >  Lower chest: Right lower lobe bronchial wall thickening with mucus plugging in the medial lobe and subsegmental atelectasis distally, consistent with small airways disease. Linear atelectasis versus scarring in the right middle lobe.    < end of copied text >  < from: CT Abdomen and Pelvis w/ Oral Cont and w/ IV Cont (11.29.20 @ 03:00) >  Impression:  4 cm air and debris-filled thick-walled structure arises from a 1.4 cm defect along the lesser curvature of the stomach with surrounding fat infiltration is consistent with a contained perforation, probably related to gastric ulcer. Trace scattered ascites. No organized collections or free air within the peritoneum.    < end of copied text >  < from: CT Abdomen and Pelvis w/ Oral Cont and w/ IV Cont (11.29.20 @ 03:00) >  Vessels: Moderate noncalcified and calcified plaque of the infrarenal abdominal aorta. Apparent single celiac-mesenteric trunk with a small caliber SMA arising inferiorly. Questionable severe narrowing versus occlusion of the proximal SMA branch (series 4 image 31, series 3 image 33) but with immediate reconstitution distally.    < end of copied text >

## 2020-11-30 NOTE — PROGRESS NOTE ADULT - PROBLEM SELECTOR PLAN 1
Management as per primary team  -Currently medical therapy  -Pending H. Pylori sample  -Obtain collateral regarding possible H. Pylori sample

## 2020-11-30 NOTE — PROGRESS NOTE ADULT - ASSESSMENT
61 yo M no PMH/PSH, extensive smoking history (44yrs ppd) presents with contained gastric perforation.     NPO/IVF  Fluconazole, zosyn, and flagyl  F/u stool antigen  Protonix BID  Pain/Nausea control PRN  AM labs  NGT in [facility]IS  VTE PPX with SCDs, no chemical ppx in setting of GIB

## 2020-11-30 NOTE — PROGRESS NOTE ADULT - SUBJECTIVE AND OBJECTIVE BOX
SUBJECTIVE: Seen and evaluated this AM. No acute events overnight. Resting comfortably in bed. Endorses some abdominal pain, though well controlled with current analgesics. Denies and nausea. NG to LIWS with bright red frothy output. Denies any BMs since admission. Denies f/c, CP, SOB, melena, hematochezia, weakness or pain in extremities.     fluconAZOLE IVPB 400 milliGRAM(s) IV Intermittent every 24 hours  metroNIDAZOLE  IVPB 500 milliGRAM(s) IV Intermittent every 8 hours  piperacillin/tazobactam IVPB.. 3.375 Gram(s) IV Intermittent every 6 hours      Vital Signs Last 24 Hrs  T(C): 36.8 (30 Nov 2020 05:04), Max: 37.2 (29 Nov 2020 09:17)  T(F): 98.2 (30 Nov 2020 05:04), Max: 98.9 (29 Nov 2020 09:17)  HR: 50 (30 Nov 2020 04:15) (46 - 58)  BP: 100/59 (30 Nov 2020 04:15) (95/59 - 109/62)  BP(mean): 71 (30 Nov 2020 04:15) (71 - 81)  RR: 16 (30 Nov 2020 04:15) (16 - 18)  SpO2: 95% (30 Nov 2020 04:15) (92% - 95%)  I&O's Detail    29 Nov 2020 07:01  -  30 Nov 2020 07:00  --------------------------------------------------------  IN:    dextrose 5% + lactated ringers w/ Additives: 1440 mL  Total IN: 1440 mL    OUT:    Nasogastric/Oral tube (mL): 1650 mL    Voided (mL): 1390 mL  Total OUT: 3040 mL    Total NET: -1600 mL      General: NAD, resting comfortably in bed  C/V: NSR  Neck: no neck or supraclavicular LAD  Pulm: Nonlabored breathing, no respiratory distress  Abd: soft, nondistended, mildly TTP in the epigastrium. No rebound or guarding. No periumbilical LAD.  Extrem: WWP, no edema, SCDs in place        LABS:                        13.2   9.52  )-----------( 269      ( 30 Nov 2020 05:52 )             40.5     11-30    142  |  107  |  9   ----------------------------<  106<H>  4.0   |  28  |  1.03    Ca    8.6      30 Nov 2020 05:52  Phos  3.5     11-30  Mg     2.0     11-30    TPro  7.7  /  Alb  4.2  /  TBili  0.7  /  DBili  x   /  AST  14  /  ALT  12  /  AlkPhos  80  11-29    PT/INR - ( 29 Nov 2020 00:49 )   PT: 14.4 sec;   INR: 1.21          PTT - ( 29 Nov 2020 00:49 )  PTT:29.9 sec  Urinalysis Basic - ( 29 Nov 2020 04:46 )    Color: Yellow / Appearance: Clear / SG: <=1.005 / pH: x  Gluc: x / Ketone: NEGATIVE  / Bili: Negative / Urobili: 0.2 E.U./dL   Blood: x / Protein: NEGATIVE mg/dL / Nitrite: NEGATIVE   Leuk Esterase: NEGATIVE / RBC: x / WBC x   Sq Epi: x / Non Sq Epi: x / Bacteria: x

## 2020-11-30 NOTE — PROVIDER CONTACT NOTE (OTHER) - ASSESSMENT
pt currently alert laying in bed watching TV. pt responded to having chest pain due to NGT but denies any radiating pain to the arm, pt denies SOB and numbness or tingling of arms and legs. /69, HR 54, RR 16, SPO2 93% on RA

## 2020-11-30 NOTE — PROGRESS NOTE ADULT - SUBJECTIVE AND OBJECTIVE BOX
Serial Abdominal Exam @ 00:00    S: Pt reports that his abdominal pain is improving. Denies N/V. Still no BM, stool antigen test pending. NGT output with no clots, dark brown appearing. Denies CP, SOB, calf tenderness. Nurse at bedside.    O:  T(C): 37.1 (11-29-20 @ 22:10), Max: 37.1 (11-29-20 @ 22:10)  T(F): 98.7 (11-29-20 @ 22:10), Max: 98.7 (11-29-20 @ 22:10)  HR: 53 (11-29-20 @ 20:19) (53 - 53)  BP: 104/63 (11-29-20 @ 20:19) (104/63 - 104/63)  RR: 17 (11-29-20 @ 20:19) (17 - 17)  SpO2: 93% (11-29-20 @ 20:19) (93% - 93%)  Wt(kg): --                        13.7   12.57 )-----------( 255      ( 29 Nov 2020 06:23 )             41.7     11-29    137  |  100  |  7   ----------------------------<  102<H>  3.9   |  27  |  0.79    Ca    8.1<L>      29 Nov 2020 06:23  Phos  3.6     11-29  Mg     1.9     11-29    TPro  7.7  /  Alb  4.2  /  TBili  0.7  /  DBili  x   /  AST  14  /  ALT  12  /  AlkPhos  80  11-29      Gen: NAD, resting comfortably in bed, NGT in place w dark brown output  C/V: pulses present and strong in the b/l upper extremities   Pulm: Nonlabored breathing, no respiratory distress  Abd: soft, nondistended, mildly tender to palpation of epigastrum and LUQ  Extrem: WWP, no calf edema, SCDs in place     Will continue to monitor.

## 2020-12-01 LAB
ANION GAP SERPL CALC-SCNC: 11 MMOL/L — SIGNIFICANT CHANGE UP (ref 5–17)
ANION GAP SERPL CALC-SCNC: 11 MMOL/L — SIGNIFICANT CHANGE UP (ref 5–17)
APTT BLD: 25.5 SEC — LOW (ref 27.5–35.5)
BLD GP AB SCN SERPL QL: NEGATIVE — SIGNIFICANT CHANGE UP
BUN SERPL-MCNC: 7 MG/DL — SIGNIFICANT CHANGE UP (ref 7–23)
BUN SERPL-MCNC: 8 MG/DL — SIGNIFICANT CHANGE UP (ref 7–23)
CALCIUM SERPL-MCNC: 8.4 MG/DL — SIGNIFICANT CHANGE UP (ref 8.4–10.5)
CALCIUM SERPL-MCNC: 8.6 MG/DL — SIGNIFICANT CHANGE UP (ref 8.4–10.5)
CHLORIDE SERPL-SCNC: 104 MMOL/L — SIGNIFICANT CHANGE UP (ref 96–108)
CHLORIDE SERPL-SCNC: 104 MMOL/L — SIGNIFICANT CHANGE UP (ref 96–108)
CO2 SERPL-SCNC: 26 MMOL/L — SIGNIFICANT CHANGE UP (ref 22–31)
CO2 SERPL-SCNC: 27 MMOL/L — SIGNIFICANT CHANGE UP (ref 22–31)
CREAT SERPL-MCNC: 1 MG/DL — SIGNIFICANT CHANGE UP (ref 0.5–1.3)
CREAT SERPL-MCNC: 1.03 MG/DL — SIGNIFICANT CHANGE UP (ref 0.5–1.3)
GLUCOSE BLDC GLUCOMTR-MCNC: 130 MG/DL — HIGH (ref 70–99)
GLUCOSE SERPL-MCNC: 107 MG/DL — HIGH (ref 70–99)
GLUCOSE SERPL-MCNC: 111 MG/DL — HIGH (ref 70–99)
HCT VFR BLD CALC: 39.9 % — SIGNIFICANT CHANGE UP (ref 39–50)
HCT VFR BLD CALC: 44.8 % — SIGNIFICANT CHANGE UP (ref 39–50)
HGB BLD-MCNC: 13 G/DL — SIGNIFICANT CHANGE UP (ref 13–17)
HGB BLD-MCNC: 14.6 G/DL — SIGNIFICANT CHANGE UP (ref 13–17)
INR BLD: 1.21 — HIGH (ref 0.88–1.16)
LACTATE SERPL-SCNC: 1.7 MMOL/L — SIGNIFICANT CHANGE UP (ref 0.5–2)
MAGNESIUM SERPL-MCNC: 2 MG/DL — SIGNIFICANT CHANGE UP (ref 1.6–2.6)
MCHC RBC-ENTMCNC: 29.2 PG — SIGNIFICANT CHANGE UP (ref 27–34)
MCHC RBC-ENTMCNC: 29.5 PG — SIGNIFICANT CHANGE UP (ref 27–34)
MCHC RBC-ENTMCNC: 32.6 GM/DL — SIGNIFICANT CHANGE UP (ref 32–36)
MCHC RBC-ENTMCNC: 32.6 GM/DL — SIGNIFICANT CHANGE UP (ref 32–36)
MCV RBC AUTO: 89.6 FL — SIGNIFICANT CHANGE UP (ref 80–100)
MCV RBC AUTO: 90.5 FL — SIGNIFICANT CHANGE UP (ref 80–100)
NRBC # BLD: 0 /100 WBCS — SIGNIFICANT CHANGE UP (ref 0–0)
NRBC # BLD: 0 /100 WBCS — SIGNIFICANT CHANGE UP (ref 0–0)
PHOSPHATE SERPL-MCNC: 3.7 MG/DL — SIGNIFICANT CHANGE UP (ref 2.5–4.5)
PLATELET # BLD AUTO: 242 K/UL — SIGNIFICANT CHANGE UP (ref 150–400)
PLATELET # BLD AUTO: 297 K/UL — SIGNIFICANT CHANGE UP (ref 150–400)
POTASSIUM SERPL-MCNC: 3.6 MMOL/L — SIGNIFICANT CHANGE UP (ref 3.5–5.3)
POTASSIUM SERPL-MCNC: 3.7 MMOL/L — SIGNIFICANT CHANGE UP (ref 3.5–5.3)
POTASSIUM SERPL-SCNC: 3.6 MMOL/L — SIGNIFICANT CHANGE UP (ref 3.5–5.3)
POTASSIUM SERPL-SCNC: 3.7 MMOL/L — SIGNIFICANT CHANGE UP (ref 3.5–5.3)
PROTHROM AB SERPL-ACNC: 14.4 SEC — HIGH (ref 10.6–13.6)
RBC # BLD: 4.41 M/UL — SIGNIFICANT CHANGE UP (ref 4.2–5.8)
RBC # BLD: 5 M/UL — SIGNIFICANT CHANGE UP (ref 4.2–5.8)
RBC # FLD: 13.3 % — SIGNIFICANT CHANGE UP (ref 10.3–14.5)
RBC # FLD: 13.4 % — SIGNIFICANT CHANGE UP (ref 10.3–14.5)
RH IG SCN BLD-IMP: POSITIVE — SIGNIFICANT CHANGE UP
SODIUM SERPL-SCNC: 141 MMOL/L — SIGNIFICANT CHANGE UP (ref 135–145)
SODIUM SERPL-SCNC: 142 MMOL/L — SIGNIFICANT CHANGE UP (ref 135–145)
WBC # BLD: 10.74 K/UL — HIGH (ref 3.8–10.5)
WBC # BLD: 11.38 K/UL — HIGH (ref 3.8–10.5)
WBC # FLD AUTO: 10.74 K/UL — HIGH (ref 3.8–10.5)
WBC # FLD AUTO: 11.38 K/UL — HIGH (ref 3.8–10.5)

## 2020-12-01 PROCEDURE — 74240 X-RAY XM UPR GI TRC 1CNTRST: CPT | Mod: 26

## 2020-12-01 PROCEDURE — 99233 SBSQ HOSP IP/OBS HIGH 50: CPT

## 2020-12-01 PROCEDURE — 99232 SBSQ HOSP IP/OBS MODERATE 35: CPT

## 2020-12-01 RX ORDER — SODIUM CHLORIDE 9 MG/ML
1000 INJECTION, SOLUTION INTRAVENOUS ONCE
Refills: 0 | Status: COMPLETED | OUTPATIENT
Start: 2020-12-01 | End: 2020-12-01

## 2020-12-01 RX ORDER — POTASSIUM CHLORIDE 20 MEQ
10 PACKET (EA) ORAL
Refills: 0 | Status: COMPLETED | OUTPATIENT
Start: 2020-12-01 | End: 2020-12-01

## 2020-12-01 RX ORDER — SODIUM CHLORIDE 9 MG/ML
1000 INJECTION, SOLUTION INTRAVENOUS ONCE
Refills: 0 | Status: DISCONTINUED | OUTPATIENT
Start: 2020-12-01 | End: 2020-12-01

## 2020-12-01 RX ADMIN — Medication 1 APPLICATION(S): at 14:02

## 2020-12-01 RX ADMIN — Medication 100 MILLIEQUIVALENT(S): at 14:02

## 2020-12-01 RX ADMIN — Medication 3 MILLILITER(S): at 00:23

## 2020-12-01 RX ADMIN — Medication 100 MILLIGRAM(S): at 23:39

## 2020-12-01 RX ADMIN — PANTOPRAZOLE SODIUM 40 MILLIGRAM(S): 20 TABLET, DELAYED RELEASE ORAL at 17:03

## 2020-12-01 RX ADMIN — Medication 1 APPLICATION(S): at 05:47

## 2020-12-01 RX ADMIN — PIPERACILLIN AND TAZOBACTAM 200 GRAM(S): 4; .5 INJECTION, POWDER, LYOPHILIZED, FOR SOLUTION INTRAVENOUS at 16:25

## 2020-12-01 RX ADMIN — Medication 100 MILLIEQUIVALENT(S): at 10:47

## 2020-12-01 RX ADMIN — Medication 1 APPLICATION(S): at 22:34

## 2020-12-01 RX ADMIN — PANTOPRAZOLE SODIUM 40 MILLIGRAM(S): 20 TABLET, DELAYED RELEASE ORAL at 05:47

## 2020-12-01 RX ADMIN — SODIUM CHLORIDE 1000 MILLILITER(S): 9 INJECTION, SOLUTION INTRAVENOUS at 07:35

## 2020-12-01 RX ADMIN — Medication 1 PATCH: at 18:00

## 2020-12-01 RX ADMIN — Medication 1 PATCH: at 07:23

## 2020-12-01 RX ADMIN — Medication 1 PATCH: at 11:32

## 2020-12-01 RX ADMIN — PIPERACILLIN AND TAZOBACTAM 200 GRAM(S): 4; .5 INJECTION, POWDER, LYOPHILIZED, FOR SOLUTION INTRAVENOUS at 05:47

## 2020-12-01 RX ADMIN — PIPERACILLIN AND TAZOBACTAM 200 GRAM(S): 4; .5 INJECTION, POWDER, LYOPHILIZED, FOR SOLUTION INTRAVENOUS at 22:34

## 2020-12-01 RX ADMIN — SODIUM CHLORIDE 120 MILLILITER(S): 9 INJECTION, SOLUTION INTRAVENOUS at 10:25

## 2020-12-01 RX ADMIN — Medication 100 MILLIEQUIVALENT(S): at 09:42

## 2020-12-01 RX ADMIN — PIPERACILLIN AND TAZOBACTAM 200 GRAM(S): 4; .5 INJECTION, POWDER, LYOPHILIZED, FOR SOLUTION INTRAVENOUS at 11:54

## 2020-12-01 RX ADMIN — Medication 100 MILLIGRAM(S): at 08:29

## 2020-12-01 RX ADMIN — FLUCONAZOLE 100 MILLIGRAM(S): 150 TABLET ORAL at 05:47

## 2020-12-01 RX ADMIN — Medication 100 MILLIGRAM(S): at 16:24

## 2020-12-01 NOTE — PROGRESS NOTE ADULT - PROBLEM SELECTOR PLAN 1
Management as per primary team  -Currently medical therapy, IV PPI BID  -C/w Abx for now: zosyn/fluconazole, probably no need to c/w flagyl as zosyn has anaerobic coverage  -F/up UGI contrast study being done today.  -Prior EGD back in 9/30/2020 was negative for H.pylori/showed antral gastritis.

## 2020-12-01 NOTE — PROGRESS NOTE ADULT - SUBJECTIVE AND OBJECTIVE BOX
SUBJECTIVE: Patient seen and examined bedside. Patient reports that he is no longer dizzy. Patient reports mild LUQ pain. Patient denies nausea, vomiting, chest pain, and shortness of breath. Patient reports that he is passing flatus.    fluconAZOLE IVPB 400 milliGRAM(s) IV Intermittent every 24 hours  metroNIDAZOLE  IVPB 500 milliGRAM(s) IV Intermittent every 8 hours  piperacillin/tazobactam IVPB.. 3.375 Gram(s) IV Intermittent every 6 hours      Vital Signs Last 24 Hrs  T(C): 36.4 (01 Dec 2020 05:00), Max: 37.1 (30 Nov 2020 13:52)  T(F): 97.5 (01 Dec 2020 05:00), Max: 98.8 (30 Nov 2020 13:52)  HR: 50 (01 Dec 2020 04:23) (42 - 54)  BP: 107/61 (01 Dec 2020 04:23) (105/68 - 117/68)  BP(mean): 78 (01 Dec 2020 04:23) (78 - 87)  RR: 17 (01 Dec 2020 04:23) (16 - 17)  SpO2: 92% (01 Dec 2020 04:23) (92% - 95%)  I&O's Detail    30 Nov 2020 07:01  -  01 Dec 2020 07:00  --------------------------------------------------------  IN:    dextrose 5% + lactated ringers: 2640 mL    IV PiggyBack: 200 mL    Lactated Ringers Bolus: 750 mL  Total IN: 3590 mL    OUT:    Nasogastric/Oral tube (mL): 1200 mL    Voided (mL): 1000 mL  Total OUT: 2200 mL    Total NET: 1390 mL          General: NAD, resting comfortably in bed  C/V: NSR  Pulm: Nonlabored breathing, no respiratory distress  Abd: soft, nondistended, mildly LUQ pain. NGT in place.  Extrem: WWP, no edema, SCDs in place        LABS:                        14.6   10.74 )-----------( 297      ( 01 Dec 2020 06:23 )             44.8     12-01    142  |  104  |  7   ----------------------------<  107<H>  3.6   |  27  |  1.00    Ca    8.6      01 Dec 2020 06:23  Phos  3.7     12-01  Mg     2.0     12-01      PT/INR - ( 01 Dec 2020 06:36 )   PT: 14.4 sec;   INR: 1.21          PTT - ( 01 Dec 2020 06:36 )  PTT:25.5 sec      RADIOLOGY & ADDITIONAL STUDIES:

## 2020-12-01 NOTE — PROVIDER CONTACT NOTE (OTHER) - ASSESSMENT
pt complained of lightheadedness, dizziness and sweating. BP was 65/47, FS was 130 pt complained of lightheadedness, dizziness and sweating. BP was 65/47, HR 42, SPO 92% on RA, RR 17, FS was 130

## 2020-12-01 NOTE — PROGRESS NOTE ADULT - SUBJECTIVE AND OBJECTIVE BOX
Serial Abdominal Exam @ 02:02    S: Pt reports that his pain is the same as earlier. He is wondering when the NGT can be removed. Denies CP, SOB, calf tenderness.     O:  T(C): 37.1 (11-30-20 @ 22:32), Max: 37.1 (11-30-20 @ 22:32)  T(F): 98.7 (11-30-20 @ 22:32), Max: 98.7 (11-30-20 @ 22:32)  HR: 54 (12-01-20 @ 00:10) (54 - 54)  BP: 117/68 (12-01-20 @ 00:10) (117/68 - 117/68)  RR: 16 (12-01-20 @ 00:10) (16 - 16)  SpO2: 93% (12-01-20 @ 00:10) (93% - 93%)  Wt(kg): --                        13.2   9.52  )-----------( 269      ( 30 Nov 2020 05:52 )             40.5     11-30    142  |  107  |  9   ----------------------------<  106<H>  4.0   |  28  |  1.03    Ca    8.6      30 Nov 2020 05:52  Phos  3.5     11-30  Mg     2.0     11-30        Gen: NAD, resting comfortably in bed  C/V: pulses present and strong in the b/l upper extremities   Pulm: Nonlabored breathing, no respiratory distress  Abd: soft, nondistended, tender to palpation of the LUQ, no rebound or guarding   Extrem: WWP, no calf edema, SCDs in place     Will continue to monitor.

## 2020-12-01 NOTE — PROGRESS NOTE ADULT - PROBLEM SELECTOR PLAN 6
HR of 61 on EKG, on monitor was fluctuating between 50-60's  Asymptomatic  Continue to monitor for now

## 2020-12-01 NOTE — PROGRESS NOTE ADULT - SUBJECTIVE AND OBJECTIVE BOX
Patient is a 60y old  Male who presents with a chief complaint of gastric perforation (01 Dec 2020 07:16)      INTERVAL HPI/OVERNIGHT EVENTS:  Seen by me this morning, doing well. Awaiting UGI series to be done. Hypotensive episode this morning when phlebotomist came to draw blood from patient, resolved soon after. Patient has been told he has low BP before, but has never been told he has slow heart rate. Denies dizziness/lightheadedness or chest pain at time of visit. +Epigastric abdominal pain. No nausea or vomiting.    Review of Systems: 12 point review of systems otherwise negative    MEDICATIONS  (STANDING):  dextrose 5% + lactated ringers. 1000 milliLiter(s) (120 mL/Hr) IV Continuous <Continuous>  erythromycin   Ointment 1 Application(s) Both EYES every 8 hours  fluconAZOLE IVPB 400 milliGRAM(s) IV Intermittent every 24 hours  influenza   Vaccine 0.5 milliLiter(s) IntraMuscular once  metroNIDAZOLE  IVPB 500 milliGRAM(s) IV Intermittent every 8 hours  nicotine -  14 mG/24Hr(s) Patch 1 patch Transdermal daily  pantoprazole  Injectable 40 milliGRAM(s) IV Push every 12 hours  piperacillin/tazobactam IVPB.. 3.375 Gram(s) IV Intermittent every 6 hours    MEDICATIONS  (PRN):  albuterol/ipratropium for Nebulization 3 milliLiter(s) Nebulizer every 6 hours PRN Shortness of Breath and/or Wheezing      Allergies    No Known Allergies    Intolerances          Vital Signs Last 24 Hrs  T(C): 35.9 (01 Dec 2020 09:01), Max: 37.1 (30 Nov 2020 22:32)  T(F): 96.6 (01 Dec 2020 09:01), Max: 98.7 (30 Nov 2020 22:32)  HR: 44 (01 Dec 2020 14:05) (44 - 54)  BP: 122/70 (01 Dec 2020 14:05) (105/68 - 122/70)  BP(mean): 89 (01 Dec 2020 14:05) (78 - 89)  RR: 17 (01 Dec 2020 14:05) (16 - 18)  SpO2: 96% (01 Dec 2020 14:05) (92% - 96%)  CAPILLARY BLOOD GLUCOSE      POCT Blood Glucose.: 130 mg/dL (01 Dec 2020 06:13)      11-30 @ 07:01  -  12-01 @ 07:00  --------------------------------------------------------  IN: 4470 mL / OUT: 2200 mL / NET: 2270 mL    12-01 @ 07:01  -  12-01 @ 16:55  --------------------------------------------------------  IN: 1080 mL / OUT: 1100 mL / NET: -20 mL        Physical Exam:    Daily     Daily   General:  Well appearing, NAD, not cachetic  HEENT:  Nonicteric, PERRLA  CV:  RRR, no murmur, no JVD  Lungs:  CTA B/L, no wheezes, rales, rhonchi  Abdomen:  Soft, mild TTP epigastric area, non-distended  Extremities:  2+ pulses, no c/c, no edema  Skin:  Warm and dry, no rashes  :  No guzman  Neuro:  AAOx3, non-focal, CN II-XII grossly intact  No Restraints    LABS:                        13.0   11.38 )-----------( 242      ( 01 Dec 2020 10:27 )             39.9     12-01    141  |  104  |  8   ----------------------------<  111<H>  3.7   |  26  |  1.03    Ca    8.4      01 Dec 2020 10:27  Phos  3.7     12-01  Mg     2.0     12-01      PT/INR - ( 01 Dec 2020 06:36 )   PT: 14.4 sec;   INR: 1.21          PTT - ( 01 Dec 2020 06:36 )  PTT:25.5 sec        RADIOLOGY & ADDITIONAL TESTS:  \

## 2020-12-01 NOTE — PROGRESS NOTE ADULT - ASSESSMENT
A/P: 61 yo M no PMH/PSH, extensive smoking history presents with contained gastric perforation.     NPO/IVF  Protonix  Pain/Nausea control PRN  Zosyn/Flagyl/Fluconazole  AM labs  NGT in [facility]

## 2020-12-01 NOTE — PROGRESS NOTE ADULT - SUBJECTIVE AND OBJECTIVE BOX
Serial Abdominal Exam @ 21:01    S: Pt reports that he is no longer experiencing abdominal pain at rest. Denies N/V. +F/+BM. Denies CP, SOB, calf tenderness.     O:  T(C): 36.8 (12-01-20 @ 17:15), Max: 36.8 (12-01-20 @ 17:15)  T(F): 98.2 (12-01-20 @ 17:15), Max: 98.2 (12-01-20 @ 17:15)  HR: 58 (12-01-20 @ 20:14) (54 - 58)  BP: 120/64 (12-01-20 @ 20:14) (120/64 - 125/72)  RR: 18 (12-01-20 @ 20:14) (17 - 18)  SpO2: 94% (12-01-20 @ 20:14) (94% - 95%)  Wt(kg): --                        13.0   11.38 )-----------( 242      ( 01 Dec 2020 10:27 )             39.9     12-01    141  |  104  |  8   ----------------------------<  111<H>  3.7   |  26  |  1.03    Ca    8.4      01 Dec 2020 10:27  Phos  3.7     12-01  Mg     2.0     12-01        Gen: NAD, resting comfortably in bed  C/V: pulses present and strong in the b/l upper extremities   Pulm: Nonlabored breathing, no respiratory distress  Abd: soft, nondistended, mild epigastric tenderness, no rebound or guarding   Extrem: WWP, no calf edema, SCDs in place     Will continue to monitor.

## 2020-12-02 DIAGNOSIS — E44.0 MODERATE PROTEIN-CALORIE MALNUTRITION: ICD-10-CM

## 2020-12-02 LAB
ANION GAP SERPL CALC-SCNC: 10 MMOL/L — SIGNIFICANT CHANGE UP (ref 5–17)
BUN SERPL-MCNC: 4 MG/DL — LOW (ref 7–23)
CALCIUM SERPL-MCNC: 8 MG/DL — LOW (ref 8.4–10.5)
CHLORIDE SERPL-SCNC: 107 MMOL/L — SIGNIFICANT CHANGE UP (ref 96–108)
CO2 SERPL-SCNC: 26 MMOL/L — SIGNIFICANT CHANGE UP (ref 22–31)
CREAT SERPL-MCNC: 0.99 MG/DL — SIGNIFICANT CHANGE UP (ref 0.5–1.3)
GLUCOSE SERPL-MCNC: 88 MG/DL — SIGNIFICANT CHANGE UP (ref 70–99)
HCT VFR BLD CALC: 39.7 % — SIGNIFICANT CHANGE UP (ref 39–50)
HGB BLD-MCNC: 13 G/DL — SIGNIFICANT CHANGE UP (ref 13–17)
MAGNESIUM SERPL-MCNC: 1.7 MG/DL — SIGNIFICANT CHANGE UP (ref 1.6–2.6)
MCHC RBC-ENTMCNC: 29.8 PG — SIGNIFICANT CHANGE UP (ref 27–34)
MCHC RBC-ENTMCNC: 32.7 GM/DL — SIGNIFICANT CHANGE UP (ref 32–36)
MCV RBC AUTO: 91.1 FL — SIGNIFICANT CHANGE UP (ref 80–100)
NRBC # BLD: 0 /100 WBCS — SIGNIFICANT CHANGE UP (ref 0–0)
PHOSPHATE SERPL-MCNC: 3.4 MG/DL — SIGNIFICANT CHANGE UP (ref 2.5–4.5)
PLATELET # BLD AUTO: 247 K/UL — SIGNIFICANT CHANGE UP (ref 150–400)
POTASSIUM SERPL-MCNC: 3.6 MMOL/L — SIGNIFICANT CHANGE UP (ref 3.5–5.3)
POTASSIUM SERPL-SCNC: 3.6 MMOL/L — SIGNIFICANT CHANGE UP (ref 3.5–5.3)
RBC # BLD: 4.36 M/UL — SIGNIFICANT CHANGE UP (ref 4.2–5.8)
RBC # FLD: 13.2 % — SIGNIFICANT CHANGE UP (ref 10.3–14.5)
SODIUM SERPL-SCNC: 143 MMOL/L — SIGNIFICANT CHANGE UP (ref 135–145)
TROPONIN T SERPL-MCNC: <0.01 NG/ML — SIGNIFICANT CHANGE UP (ref 0–0.01)
WBC # BLD: 8.94 K/UL — SIGNIFICANT CHANGE UP (ref 3.8–10.5)
WBC # FLD AUTO: 8.94 K/UL — SIGNIFICANT CHANGE UP (ref 3.8–10.5)

## 2020-12-02 PROCEDURE — 99232 SBSQ HOSP IP/OBS MODERATE 35: CPT

## 2020-12-02 PROCEDURE — 99406 BEHAV CHNG SMOKING 3-10 MIN: CPT | Mod: GC

## 2020-12-02 PROCEDURE — 99233 SBSQ HOSP IP/OBS HIGH 50: CPT | Mod: GC

## 2020-12-02 RX ORDER — CLARITHROMYCIN 500 MG
500 TABLET ORAL
Refills: 0 | Status: DISCONTINUED | OUTPATIENT
Start: 2020-12-02 | End: 2020-12-04

## 2020-12-02 RX ORDER — POTASSIUM CHLORIDE 20 MEQ
40 PACKET (EA) ORAL ONCE
Refills: 0 | Status: COMPLETED | OUTPATIENT
Start: 2020-12-02 | End: 2020-12-02

## 2020-12-02 RX ORDER — HEPARIN SODIUM 5000 [USP'U]/ML
5000 INJECTION INTRAVENOUS; SUBCUTANEOUS EVERY 8 HOURS
Refills: 0 | Status: DISCONTINUED | OUTPATIENT
Start: 2020-12-02 | End: 2020-12-04

## 2020-12-02 RX ORDER — AMOXICILLIN 250 MG/5ML
1000 SUSPENSION, RECONSTITUTED, ORAL (ML) ORAL
Refills: 0 | Status: DISCONTINUED | OUTPATIENT
Start: 2020-12-02 | End: 2020-12-04

## 2020-12-02 RX ORDER — MAGNESIUM SULFATE 500 MG/ML
2 VIAL (ML) INJECTION ONCE
Refills: 0 | Status: COMPLETED | OUTPATIENT
Start: 2020-12-02 | End: 2020-12-02

## 2020-12-02 RX ADMIN — Medication 1000 MILLIGRAM(S): at 09:18

## 2020-12-02 RX ADMIN — Medication 1000 MILLIGRAM(S): at 18:16

## 2020-12-02 RX ADMIN — Medication 50 GRAM(S): at 09:18

## 2020-12-02 RX ADMIN — HEPARIN SODIUM 5000 UNIT(S): 5000 INJECTION INTRAVENOUS; SUBCUTANEOUS at 14:49

## 2020-12-02 RX ADMIN — FLUCONAZOLE 100 MILLIGRAM(S): 150 TABLET ORAL at 04:11

## 2020-12-02 RX ADMIN — Medication 1 APPLICATION(S): at 21:24

## 2020-12-02 RX ADMIN — Medication 1 APPLICATION(S): at 14:49

## 2020-12-02 RX ADMIN — Medication 1 APPLICATION(S): at 06:01

## 2020-12-02 RX ADMIN — Medication 500 MILLIGRAM(S): at 18:17

## 2020-12-02 RX ADMIN — PANTOPRAZOLE SODIUM 40 MILLIGRAM(S): 20 TABLET, DELAYED RELEASE ORAL at 18:20

## 2020-12-02 RX ADMIN — Medication 1 PATCH: at 11:04

## 2020-12-02 RX ADMIN — Medication 40 MILLIEQUIVALENT(S): at 09:18

## 2020-12-02 RX ADMIN — PANTOPRAZOLE SODIUM 40 MILLIGRAM(S): 20 TABLET, DELAYED RELEASE ORAL at 06:01

## 2020-12-02 RX ADMIN — HEPARIN SODIUM 5000 UNIT(S): 5000 INJECTION INTRAVENOUS; SUBCUTANEOUS at 21:24

## 2020-12-02 RX ADMIN — Medication 1 PATCH: at 18:04

## 2020-12-02 RX ADMIN — Medication 1 PATCH: at 07:08

## 2020-12-02 RX ADMIN — PIPERACILLIN AND TAZOBACTAM 200 GRAM(S): 4; .5 INJECTION, POWDER, LYOPHILIZED, FOR SOLUTION INTRAVENOUS at 03:32

## 2020-12-02 RX ADMIN — Medication 100 MILLIGRAM(S): at 07:09

## 2020-12-02 NOTE — PROGRESS NOTE ADULT - NUTRITIONAL ASSESSMENT
This patient has been assessed with a concern for Malnutrition and has been determined to have a diagnosis/diagnoses of Moderate protein-calorie malnutrition.    This patient is being managed with:   Diet Clear Liquid-  Entered: Dec  1 2020  2:16PM

## 2020-12-02 NOTE — PROVIDER CONTACT NOTE (OTHER) - SITUATION
NGT placed at bedside by MD
Patient sinus yamilet to 36 and complaining of chest pain.
Pt has bigeminy w/PVCs as per tele
pt complained of lightheadedness and dizziness and sweating. BP was 65/47
pt has low BP of 98/67

## 2020-12-02 NOTE — PROGRESS NOTE ADULT - SUBJECTIVE AND OBJECTIVE BOX
24 hr events:  ON: pain much improved. stool Ag specimen received. +F/+BMs. Yamilet to 34 @ 04:30, asx, ekg sinus yamilet. back to 50 when sitting.   12/1: hypotensive after blood drawn by lab SBP 70s, bolused 1 liter, lactate 1.7, hg stable at 14.6, SBP improved to 97, NGT removed, UGI: lesser curveature gastric ulcer no evidenance of extravasation, advanced to CLD    SUBJECTIVE:  Pt seen and examined by chief resident. Pt is doing well, resting comfortably on bed. Pain controlled. Reporting some chest discomfort. Denies dizziness lightheadedness, difficultly breathing.  at this time.    Vital Signs Last 24 Hrs  T(C): 36.4 (02 Dec 2020 05:03), Max: 36.8 (01 Dec 2020 17:15)  T(F): 97.5 (02 Dec 2020 05:03), Max: 98.2 (01 Dec 2020 17:15)  HR: 46 (02 Dec 2020 04:33) (42 - 58)  BP: 95/60 (02 Dec 2020 04:33) (95/60 - 125/72)  BP(mean): 72 (02 Dec 2020 04:33) (71 - 92)  RR: 18 (02 Dec 2020 04:33) (17 - 18)  SpO2: 92% (02 Dec 2020 04:33) (92% - 97%)    Physical Exam:  General: NAD  Pulmonary: Nonlabored breathing, no respiratory distress  Cardiovascular: sinus yamilet 40s-50s  Abdominal: soft, mild tenderness in the epigastrium, nondistended.   Extremities: WWP, SCDs in place    I&O's Summary    01 Dec 2020 07:01  -  02 Dec 2020 07:00  --------------------------------------------------------  IN: 2180 mL / OUT: 1600 mL / NET: 580 mL        LABS:                        13.0   8.94  )-----------( 247      ( 02 Dec 2020 05:37 )             39.7     12-02    143  |  107  |  4<L>  ----------------------------<  88  3.6   |  26  |  0.99    Ca    8.0<L>      02 Dec 2020 05:37  Phos  3.4     12-02  Mg     1.7     12-02      PT/INR - ( 01 Dec 2020 06:36 )   PT: 14.4 sec;   INR: 1.21          PTT - ( 01 Dec 2020 06:36 )  PTT:25.5 sec

## 2020-12-02 NOTE — DIETITIAN INITIAL EVALUATION ADULT. - ADD RECOMMEND
1. Please adv. to fulls > high fiber as medically feasible 2. EnsureEnlive TID once adv. past clears 3. Please obtain updated weight for accuracy and trending. 4. BMP, BG Q6hrs, CBC per MD discretion

## 2020-12-02 NOTE — PROVIDER CONTACT NOTE (OTHER) - ACTION/TREATMENT ORDERED:
NGT placed at bedside by MD, Xray was done for placement verification. MD examined Xray.
Alix STEWART aware of BP, no intervention at this moment and team will see pt during rounds
Heather STAFFORD made aware, no further intervention and continue to monitor pt as per provider
Pt placed back to bed. Pt was assessed at bedside by Brenton STEWART and Vladimir STEWART. 1L LR bolus was given, Lactate, coags, Type and screen were drawn.
no intervention given. Will continue to monitor.

## 2020-12-02 NOTE — PROGRESS NOTE ADULT - ASSESSMENT
A/P: 61 yo M no PMH/PSH, extensive smoking history presents with contained gastric perforation.     CLD/IVF  Protonix  Pain/Nausea control PRN  Zosyn/Flagyl/Fluconazole  OOBA/SCD/IS/SQH  F/u with medicine this AM regarding bradycardia  AM labs

## 2020-12-02 NOTE — DIETITIAN INITIAL EVALUATION ADULT. - OTHER INFO
61yo M no PMH/PSH extensive smoking history presents with a 5 day history of acute onset sharp abdominal pain. Admitted for contained gastric perforation with plan for NPO and bowel rest.  NGT removed and UGI was done 12/1.     Pt seen in room, resting in bed. Currently on a clear liquid diet and tolerating PO. Has been on Clears since 12/1 (x2 days now). Denies N/V, having BMs and passing flatus. Discussed w/ patient likely diet advancement to fulls > low fiber once deemed medically appropriate by team. Pt reports UBW is ~68-72kg, current admitted wt is 64.2kg. Pt admits to suspecting weight loss as well as he hasnt eaten in the last 7 days. Suspect moderate PCM d/t meeting <50% EER and having a 8.5% wt loss x 1-2 weeks. NKFA or dietary restrictions. Skin: intact pressure wise; GI: gastric ulcer. RD to follow.

## 2020-12-02 NOTE — DIETITIAN INITIAL EVALUATION ADULT. - OTHER CALCULATIONS
ABW (64.2kg) used for calculations as pt between % of IBW (91%). Nutrient needs based on [facility] standards of care for maintenance in adults. Needs adjusted for suspected moderate PCM

## 2020-12-02 NOTE — PROGRESS NOTE ADULT - SUBJECTIVE AND OBJECTIVE BOX
INTERVAL HPI/OVERNIGHT EVENTS: Bradycardia overnight to ~ 40    Subjective:  Pt has no acute complaints. Reports pain is improved. Tolerating diet well. Denies N/V. Denies dizziness, light headedness, CP, SOB. 12 pt ROS is otherwise negative.     Vital Signs Last 24 Hrs  T(C): 36.7 (02 Dec 2020 18:31), Max: 36.8 (02 Dec 2020 09:11)  T(F): 98 (02 Dec 2020 18:31), Max: 98.2 (02 Dec 2020 09:11)  HR: 46 (02 Dec 2020 16:04) (42 - 48)  BP: 109/63 (02 Dec 2020 16:04) (95/60 - 111/69)  BP(mean): 81 (02 Dec 2020 16:04) (71 - 85)  RR: 18 (02 Dec 2020 16:04) (17 - 18)  SpO2: 95% (02 Dec 2020 16:04) (92% - 97%)    PHYSICAL EXAM:    Constitutional: NAD  Eyes: PERRLA  ENMT: MMM  Neck: supple  Respiratory: decreased BS at bases  Cardiovascular: bradycardic, s1s2, no m/r/g  Gastrointestinal: soft, mild TTP  Extremities: wwp  Vascular: + 2 pulses radial  Neurological: AAO x 4  Musculoskeletal: no joint swelling  Skin: no rash  Psych: normal affect    MEDICATIONS  (STANDING):  amoxicillin 1000 milliGRAM(s) Oral two times a day  clarithromycin 500 milliGRAM(s) Oral two times a day  dextrose 5% + lactated ringers. 1000 milliLiter(s) (120 mL/Hr) IV Continuous <Continuous>  erythromycin   Ointment 1 Application(s) Both EYES every 8 hours  fluconAZOLE IVPB 400 milliGRAM(s) IV Intermittent every 24 hours  heparin   Injectable 5000 Unit(s) SubCutaneous every 8 hours  influenza   Vaccine 0.5 milliLiter(s) IntraMuscular once  nicotine -  14 mG/24Hr(s) Patch 1 patch Transdermal daily  pantoprazole  Injectable 40 milliGRAM(s) IV Push every 12 hours    MEDICATIONS  (PRN):  albuterol/ipratropium for Nebulization 3 milliLiter(s) Nebulizer every 6 hours PRN Shortness of Breath and/or Wheezing      Allergies    No Known Allergies    Intolerances        LABS:                                   13.0   8.94  )-----------( 247      ( 02 Dec 2020 05:37 )             39.7   12-02    143  |  107  |  4<L>  ----------------------------<  88  3.6   |  26  |  0.99    Ca    8.0<L>      02 Dec 2020 05:37  Phos  3.4     12-02  Mg     1.7     12-02        Color: Yellow / Appearance: Clear / SG: <=1.005 / pH: x  Gluc: x / Ketone: NEGATIVE  / Bili: Negative / Urobili: 0.2 E.U./dL   Blood: x / Protein: NEGATIVE mg/dL / Nitrite: NEGATIVE   Leuk Esterase: NEGATIVE / RBC: x / WBC x   Sq Epi: x / Non Sq Epi: x / Bacteria: x        RADIOLOGY & ADDITIONAL TESTS:  < from: CT Abdomen and Pelvis w/ Oral Cont and w/ IV Cont (11.29.20 @ 03:00) >  Lower chest: Right lower lobe bronchial wall thickening with mucus plugging in the medial lobe and subsegmental atelectasis distally, consistent with small airways disease. Linear atelectasis versus scarring in the right middle lobe.    < end of copied text >  < from: CT Abdomen and Pelvis w/ Oral Cont and w/ IV Cont (11.29.20 @ 03:00) >  Impression:  4 cm air and debris-filled thick-walled structure arises from a 1.4 cm defect along the lesser curvature of the stomach with surrounding fat infiltration is consistent with a contained perforation, probably related to gastric ulcer. Trace scattered ascites. No organized collections or free air within the peritoneum.    < end of copied text >  < from: CT Abdomen and Pelvis w/ Oral Cont and w/ IV Cont (11.29.20 @ 03:00) >  Vessels: Moderate noncalcified and calcified plaque of the infrarenal abdominal aorta. Apparent single celiac-mesenteric trunk with a small caliber SMA arising inferiorly. Questionable severe narrowing versus occlusion of the proximal SMA branch (series 4 image 31, series 3 image 33) but with immediate reconstitution distally.    < end of copied text >

## 2020-12-02 NOTE — CHART NOTE - TREATMENT: THE FOLLOWING DIET HAS BEEN RECOMMENDED
Recommendations:  1. Please adv. to fulls > high fiber as medically feasible   2. EnsureEnlive TID once adv. past clears   3. Please obtain updated weight for accuracy and trending.   4. BMP, BG Q6hrs, CBC per MD discretion    Findings:  Suspect moderate PCM d/t meeting <50% EER x7 days and having a 8.5% wt loss x 1-2 weeks.

## 2020-12-02 NOTE — PROVIDER CONTACT NOTE (OTHER) - BACKGROUND
Pt admitted from ED for abd pain w/ chest pain
Patient admitted for gastric ulcer
pt admitted for gastric ulcer perforation.
pt admitted for gastric ulcer perforation. pt urinated while standing at bedside into urinal, pt sat in chair afterwards, labs were drawn by technician
pt admitted from ED for abd pain radiating to chest

## 2020-12-03 LAB
ANION GAP SERPL CALC-SCNC: 9 MMOL/L — SIGNIFICANT CHANGE UP (ref 5–17)
BUN SERPL-MCNC: 3 MG/DL — LOW (ref 7–23)
CALCIUM SERPL-MCNC: 7.9 MG/DL — LOW (ref 8.4–10.5)
CHLORIDE SERPL-SCNC: 106 MMOL/L — SIGNIFICANT CHANGE UP (ref 96–108)
CO2 SERPL-SCNC: 26 MMOL/L — SIGNIFICANT CHANGE UP (ref 22–31)
CREAT SERPL-MCNC: 0.91 MG/DL — SIGNIFICANT CHANGE UP (ref 0.5–1.3)
GLUCOSE SERPL-MCNC: 99 MG/DL — SIGNIFICANT CHANGE UP (ref 70–99)
HCT VFR BLD CALC: 40.1 % — SIGNIFICANT CHANGE UP (ref 39–50)
HGB BLD-MCNC: 13.4 G/DL — SIGNIFICANT CHANGE UP (ref 13–17)
MAGNESIUM SERPL-MCNC: 1.7 MG/DL — SIGNIFICANT CHANGE UP (ref 1.6–2.6)
MCHC RBC-ENTMCNC: 29.6 PG — SIGNIFICANT CHANGE UP (ref 27–34)
MCHC RBC-ENTMCNC: 33.4 GM/DL — SIGNIFICANT CHANGE UP (ref 32–36)
MCV RBC AUTO: 88.7 FL — SIGNIFICANT CHANGE UP (ref 80–100)
NRBC # BLD: 0 /100 WBCS — SIGNIFICANT CHANGE UP (ref 0–0)
PHOSPHATE SERPL-MCNC: 2.9 MG/DL — SIGNIFICANT CHANGE UP (ref 2.5–4.5)
PLATELET # BLD AUTO: 254 K/UL — SIGNIFICANT CHANGE UP (ref 150–400)
POTASSIUM SERPL-MCNC: 3.9 MMOL/L — SIGNIFICANT CHANGE UP (ref 3.5–5.3)
POTASSIUM SERPL-SCNC: 3.9 MMOL/L — SIGNIFICANT CHANGE UP (ref 3.5–5.3)
RBC # BLD: 4.52 M/UL — SIGNIFICANT CHANGE UP (ref 4.2–5.8)
RBC # FLD: 13.2 % — SIGNIFICANT CHANGE UP (ref 10.3–14.5)
SODIUM SERPL-SCNC: 141 MMOL/L — SIGNIFICANT CHANGE UP (ref 135–145)
WBC # BLD: 7.52 K/UL — SIGNIFICANT CHANGE UP (ref 3.8–10.5)
WBC # FLD AUTO: 7.52 K/UL — SIGNIFICANT CHANGE UP (ref 3.8–10.5)

## 2020-12-03 PROCEDURE — 99233 SBSQ HOSP IP/OBS HIGH 50: CPT | Mod: GC

## 2020-12-03 PROCEDURE — 99232 SBSQ HOSP IP/OBS MODERATE 35: CPT

## 2020-12-03 PROCEDURE — 99406 BEHAV CHNG SMOKING 3-10 MIN: CPT | Mod: GC

## 2020-12-03 RX ORDER — POTASSIUM CHLORIDE 20 MEQ
20 PACKET (EA) ORAL ONCE
Refills: 0 | Status: COMPLETED | OUTPATIENT
Start: 2020-12-03 | End: 2020-12-03

## 2020-12-03 RX ORDER — PANTOPRAZOLE SODIUM 20 MG/1
40 TABLET, DELAYED RELEASE ORAL
Refills: 0 | Status: DISCONTINUED | OUTPATIENT
Start: 2020-12-03 | End: 2020-12-04

## 2020-12-03 RX ORDER — MAGNESIUM SULFATE 500 MG/ML
1 VIAL (ML) INJECTION ONCE
Refills: 0 | Status: COMPLETED | OUTPATIENT
Start: 2020-12-03 | End: 2020-12-03

## 2020-12-03 RX ORDER — FLUCONAZOLE 150 MG/1
400 TABLET ORAL EVERY 24 HOURS
Refills: 0 | Status: DISCONTINUED | OUTPATIENT
Start: 2020-12-04 | End: 2020-12-04

## 2020-12-03 RX ADMIN — Medication 1 PATCH: at 12:00

## 2020-12-03 RX ADMIN — Medication 100 GRAM(S): at 09:34

## 2020-12-03 RX ADMIN — Medication 1000 MILLIGRAM(S): at 19:03

## 2020-12-03 RX ADMIN — Medication 500 MILLIGRAM(S): at 06:38

## 2020-12-03 RX ADMIN — PANTOPRAZOLE SODIUM 40 MILLIGRAM(S): 20 TABLET, DELAYED RELEASE ORAL at 06:23

## 2020-12-03 RX ADMIN — Medication 1 PATCH: at 06:38

## 2020-12-03 RX ADMIN — Medication 500 MILLIGRAM(S): at 19:20

## 2020-12-03 RX ADMIN — Medication 1 PATCH: at 23:06

## 2020-12-03 RX ADMIN — HEPARIN SODIUM 5000 UNIT(S): 5000 INJECTION INTRAVENOUS; SUBCUTANEOUS at 23:06

## 2020-12-03 RX ADMIN — Medication 1 APPLICATION(S): at 05:58

## 2020-12-03 RX ADMIN — Medication 20 MILLIEQUIVALENT(S): at 09:34

## 2020-12-03 RX ADMIN — PANTOPRAZOLE SODIUM 40 MILLIGRAM(S): 20 TABLET, DELAYED RELEASE ORAL at 19:06

## 2020-12-03 RX ADMIN — Medication 1 APPLICATION(S): at 14:12

## 2020-12-03 RX ADMIN — Medication 1 APPLICATION(S): at 23:06

## 2020-12-03 RX ADMIN — HEPARIN SODIUM 5000 UNIT(S): 5000 INJECTION INTRAVENOUS; SUBCUTANEOUS at 14:12

## 2020-12-03 RX ADMIN — HEPARIN SODIUM 5000 UNIT(S): 5000 INJECTION INTRAVENOUS; SUBCUTANEOUS at 05:58

## 2020-12-03 RX ADMIN — Medication 1 PATCH: at 12:18

## 2020-12-03 RX ADMIN — FLUCONAZOLE 100 MILLIGRAM(S): 150 TABLET ORAL at 05:58

## 2020-12-03 RX ADMIN — Medication 1000 MILLIGRAM(S): at 05:58

## 2020-12-03 NOTE — PROGRESS NOTE ADULT - ASSESSMENT
History     Chief Complaint   Patient presents with     Pharyngitis     HPI  Zohreh Sosa is a 5 year old female who urgent care with grandmother with concern over sore throat.  Patient initially began complaining of pain yesterday.  Since then her mother has noted sores in the back of her mouth.  She has otherwise been overall well.  She has not had any recent fever, changes in behavior or activity level, nasal congestion, cough, dyspnea, wheezing, vomiting, diarrhea or changes in appetite.  Family has not attempted any OTC treatments.  She has not had any known confirmed contacts with strep throat.      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    Social History:  Marital Status:  Single [1]  Social History   Substance Use Topics     Smoking status: Passive Smoke Exposure - Never Smoker     Smokeless tobacco: Not on file     Alcohol use Not on file      Medications:      acetaminophen (TYLENOL) 160 MG/5ML elixir   albuterol (PROAIR HFA/PROVENTIL HFA/VENTOLIN HFA) 108 (90 BASE) MCG/ACT Inhaler   ibuprofen (ADVIL/MOTRIN) 100 MG/5ML suspension   order for DME     Review of Systems  CONSTITUTIONAL:NEGATIVE for fever, chills, change in weight  INTEGUMENTARY/SKIN: NEGATIVE for worrisome rashes, moles or lesions  EYES: NEGATIVE for vision changes or irritation  ENT/MOUTH: POSITIVE for sore throat and NEGATIVE for nasal congestion, ear pain  RESP:NEGATIVE for significant cough or SOB  GI:  NEGATIVE for nausea, vomiting, diarrhea or abdominal pain   Physical Exam   Pulse: 120  Temp: 97.2  F (36.2  C)  Resp: 22  SpO2: 98 %  Physical Exam  GENERAL APPEARANCE: healthy, alert and no distress  EYES: EOMI,  PERRL, conjunctiva clear  HENT: ear canals and TM's normal.  Nasal mucosa moist.  Patient has a very erythematous lesions on her posterior hard and soft palate  NECK: supple, nontender, no lymphadenopathy  RESP: lungs clear to auscultation - no rales, rhonchi or wheezes  CV: regular rates and rhythm, normal S1 S2, no murmur noted  ABDOMEN:  soft, nontender, no HSM or masses and bowel sounds normal  SKIN: five vesicular lesions on erythematous base on left hand, two vesicles present on sole of right foot   ED Course     ED Course     Procedures           Critical Care time:  none        Results for orders placed or performed during the hospital encounter of 09/21/18 (from the past 24 hour(s))   Rapid strep group A screen POCT   Result Value Ref Range    Rapid Strep A Screen negative neg    Internal QC OK Yes      Medications - No data to display   Assessments & Plan (with Medical Decision Making)     I have reviewed the nursing notes.    I have reviewed the findings, diagnosis, plan and need for follow up with the patient.       Current Discharge Medication List        Final diagnoses:   Hand, foot and mouth disease     5-year-old female brought in by grandmother with concern for 24 history of sore throat.  Patient had age-appropriate stable vital signs upon arrival.  Physical exam findings as described above.  As part of evaluation patient had rapid strep test which was negative with culture pending at time of discharge.  Symptoms most consistent with hand-foot-and-mouth disease.  He was discharged home stable with instructions for symptomatic treatment as needed with Tylenol, ibuprofen.  Follow-up with primary care provider if no improvement within the next 5-7 days.  Worrisome reasons to return to the  A/P: 61 yo M no PMH/PSH, extensive smoking history presents with contained gastric perforation.     CLD  Advance diet to thick fluids (not solids)   Pain/Nausea control PRN  H.Pylori Triple therapy  Clarithromycin (12/2- ), Amoxicillin (12/2-), Fluconazole (11/29- ), s/p Zosyn and flagyl (11/29-12/2)  Protonix 40 BID (11/29- )  OOBA/SCD/SQH/IS  AM labs   ER/UC sooner discussed.    Disclaimer: This note consists of symbols derived from keyboarding, dictation, and/or voice recognition software. As a result, there may be errors in the script that have gone undetected.  Please consider this when interpreting information found in the chart.    9/21/2018   [facility] EMERGENCY DEPARTMENT     Noemi Perez PA-C  09/21/18 [id number]     A/P: 59 yo M no PMH/PSH, extensive smoking history presents with contained gastric perforation.     FLD  Pain/Nausea control PRN  H.Pylori Triple therapy  Switched to PO Fluconazole 12/3. Clarithromycin (12/2- ), Amoxicillin (12/2-), Fluconazole (11/29- ), s/p Zosyn and flagyl (11/29-12/2)  Protonix 40 BID (11/29- )  OOBA/SCD/SQH/IS  AM labs

## 2020-12-03 NOTE — PROGRESS NOTE ADULT - NUTRITIONAL ASSESSMENT
This patient has been assessed with a concern for Malnutrition and has been determined to have a diagnosis/diagnoses of Moderate protein-calorie malnutrition.    This patient is being managed with:   Diet Full Liquid-  Entered: Dec  3 2020  8:29AM

## 2020-12-03 NOTE — PROGRESS NOTE ADULT - NUTRITIONAL ASSESSMENT
This patient has been assessed with a concern for Malnutrition and has been determined to have a diagnosis/diagnoses of Moderate protein-calorie malnutrition.    This patient is being managed with:   Advanced diet to thick liquid/ fluids   Entered: Dec  3 2020  8:32AM This patient has been assessed with a concern for Malnutrition and has been determined to have a diagnosis/diagnoses of Moderate protein-calorie malnutrition.    This patient is being managed with:   Advanced diet to full liquid diet   Entered: Dec  3 2020  8:32AM

## 2020-12-03 NOTE — PROGRESS NOTE ADULT - SUBJECTIVE AND OBJECTIVE BOX
INTERVAL HPI/OVERNIGHT EVENTS: None    Subjective:  Pt has no acute complaints. Reports pain is improved. Tolerating diet well. Denies N/V. Denies dizziness, light headedness, CP, SOB. 12 pt ROS is otherwise negative.     Vital Signs Last 24 Hrs  T(C): 37 (03 Dec 2020 15:56), Max: 37 (03 Dec 2020 15:56)  T(F): 98.6 (03 Dec 2020 15:56), Max: 98.6 (03 Dec 2020 15:56)  HR: 72 (03 Dec 2020 15:56) (50 - 72)  BP: 117/77 (03 Dec 2020 15:56) (107/69 - 128/79)  BP(mean): 84 (03 Dec 2020 12:42) (82 - 98)  RR: 17 (03 Dec 2020 15:56) (17 - 18)  SpO2: 94% (03 Dec 2020 15:56) (92% - 95%)    PHYSICAL EXAM:    Constitutional: NAD  Eyes: PERRLA  ENMT: MMM  Neck: supple  Respiratory: CTA b/l  Cardiovascular: bradycardic, s1s2, no m/r/g  Gastrointestinal: soft, mild TTP  Extremities: wwp  Vascular: + 2 pulses radial  Neurological: AAO x 4  Musculoskeletal: no joint swelling  Skin: no rash  Psych: normal affect    MEDICATIONS  (STANDING):  amoxicillin 1000 milliGRAM(s) Oral two times a day  clarithromycin 500 milliGRAM(s) Oral two times a day  erythromycin   Ointment 1 Application(s) Both EYES every 8 hours  heparin   Injectable 5000 Unit(s) SubCutaneous every 8 hours  influenza   Vaccine 0.5 milliLiter(s) IntraMuscular once  nicotine -  14 mG/24Hr(s) Patch 1 patch Transdermal daily  pantoprazole    Tablet 40 milliGRAM(s) Oral two times a day    MEDICATIONS  (PRN):  albuterol/ipratropium for Nebulization 3 milliLiter(s) Nebulizer every 6 hours PRN Shortness of Breath and/or Wheezing        Allergies    No Known Allergies    Intolerances        LABS:                                              13.4   7.52  )-----------( 254      ( 03 Dec 2020 05:53 )             40.1   12-03    141  |  106  |  3<L>  ----------------------------<  99  3.9   |  26  |  0.91    Ca    7.9<L>      03 Dec 2020 05:53  Phos  2.9     12-03  Mg     1.7     12-03            Color: Yellow / Appearance: Clear / SG: <=1.005 / pH: x  Gluc: x / Ketone: NEGATIVE  / Bili: Negative / Urobili: 0.2 E.U./dL   Blood: x / Protein: NEGATIVE mg/dL / Nitrite: NEGATIVE   Leuk Esterase: NEGATIVE / RBC: x / WBC x   Sq Epi: x / Non Sq Epi: x / Bacteria: x        RADIOLOGY & ADDITIONAL TESTS:  < from: CT Abdomen and Pelvis w/ Oral Cont and w/ IV Cont (11.29.20 @ 03:00) >  Lower chest: Right lower lobe bronchial wall thickening with mucus plugging in the medial lobe and subsegmental atelectasis distally, consistent with small airways disease. Linear atelectasis versus scarring in the right middle lobe.    < end of copied text >  < from: CT Abdomen and Pelvis w/ Oral Cont and w/ IV Cont (11.29.20 @ 03:00) >  Impression:  4 cm air and debris-filled thick-walled structure arises from a 1.4 cm defect along the lesser curvature of the stomach with surrounding fat infiltration is consistent with a contained perforation, probably related to gastric ulcer. Trace scattered ascites. No organized collections or free air within the peritoneum.    < end of copied text >  < from: CT Abdomen and Pelvis w/ Oral Cont and w/ IV Cont (11.29.20 @ 03:00) >  Vessels: Moderate noncalcified and calcified plaque of the infrarenal abdominal aorta. Apparent single celiac-mesenteric trunk with a small caliber SMA arising inferiorly. Questionable severe narrowing versus occlusion of the proximal SMA branch (series 4 image 31, series 3 image 33) but with immediate reconstitution distally.    < end of copied text >

## 2020-12-03 NOTE — PROGRESS NOTE ADULT - SUBJECTIVE AND OBJECTIVE BOX
STATUS POST:  Presents w/ contained gastric perforation, managing non- surgically.       Vital Signs Last 24 Hrs  T(C): 36.5 (03 Dec 2020 05:02), Max: 36.8 (02 Dec 2020 09:11)  T(F): 97.7 (03 Dec 2020 05:02), Max: 98.2 (02 Dec 2020 09:11)  HR: 50 (03 Dec 2020 05:00) (46 - 60)  BP: 109/66 (03 Dec 2020 05:00) (109/63 - 123/73)  BP(mean): 82 (03 Dec 2020 05:00) (81 - 93)  RR: 17 (03 Dec 2020 05:00) (17 - 18)  SpO2: 95% (03 Dec 2020 05:00) (92% - 95%)    I&O's Summary    02 Dec 2020 07:01  -  03 Dec 2020 07:00  --------------------------------------------------------  IN: 1560 mL / OUT: 1900 mL / NET: -340 mL        SUBJECTIVE: Pt seen at bedside. No acute overnight events reported. Pt states that he is tolerating clear liquid diet and abd pain is now more tolerable.     Physical Exam:  General Appearance: Appears well, NAD  Neck: Supple  Chest: Equal expansion bilaterally, equal breath sounds  CV: Pulse regular presently  Abdomen: Soft, nontense  Extremities: Grossly symmetric, SCD's in place     LABS:                        13.4   7.52  )-----------( 254      ( 03 Dec 2020 05:53 )             40.1     12-03    141  |  106  |  3<L>  ----------------------------<  99  3.9   |  26  |  0.91    Ca    7.9<L>      03 Dec 2020 05:53  Phos  2.9     12-03  Mg     1.7     12-03            RADIOLOGY & ADDITIONAL STUDIES:

## 2020-12-04 ENCOUNTER — TRANSCRIPTION ENCOUNTER (OUTPATIENT)
Age: 60
End: 2020-12-04

## 2020-12-04 VITALS
TEMPERATURE: 98 F | SYSTOLIC BLOOD PRESSURE: 112 MMHG | OXYGEN SATURATION: 94 % | DIASTOLIC BLOOD PRESSURE: 69 MMHG | RESPIRATION RATE: 17 BRPM | HEART RATE: 62 BPM

## 2020-12-04 LAB
ANION GAP SERPL CALC-SCNC: 12 MMOL/L — SIGNIFICANT CHANGE UP (ref 5–17)
BUN SERPL-MCNC: 3 MG/DL — LOW (ref 7–23)
CALCIUM SERPL-MCNC: 7.9 MG/DL — LOW (ref 8.4–10.5)
CHLORIDE SERPL-SCNC: 104 MMOL/L — SIGNIFICANT CHANGE UP (ref 96–108)
CO2 SERPL-SCNC: 24 MMOL/L — SIGNIFICANT CHANGE UP (ref 22–31)
CREAT SERPL-MCNC: 0.81 MG/DL — SIGNIFICANT CHANGE UP (ref 0.5–1.3)
GLUCOSE SERPL-MCNC: 79 MG/DL — SIGNIFICANT CHANGE UP (ref 70–99)
H PYLORI AG STL QL: SIGNIFICANT CHANGE UP
HCT VFR BLD CALC: 40.7 % — SIGNIFICANT CHANGE UP (ref 39–50)
HGB BLD-MCNC: 13.2 G/DL — SIGNIFICANT CHANGE UP (ref 13–17)
MAGNESIUM SERPL-MCNC: 1.8 MG/DL — SIGNIFICANT CHANGE UP (ref 1.6–2.6)
MCHC RBC-ENTMCNC: 28.9 PG — SIGNIFICANT CHANGE UP (ref 27–34)
MCHC RBC-ENTMCNC: 32.4 GM/DL — SIGNIFICANT CHANGE UP (ref 32–36)
MCV RBC AUTO: 89.3 FL — SIGNIFICANT CHANGE UP (ref 80–100)
NRBC # BLD: 0 /100 WBCS — SIGNIFICANT CHANGE UP (ref 0–0)
PHOSPHATE SERPL-MCNC: 3.6 MG/DL — SIGNIFICANT CHANGE UP (ref 2.5–4.5)
PLATELET # BLD AUTO: 290 K/UL — SIGNIFICANT CHANGE UP (ref 150–400)
POTASSIUM SERPL-MCNC: 3.6 MMOL/L — SIGNIFICANT CHANGE UP (ref 3.5–5.3)
POTASSIUM SERPL-SCNC: 3.6 MMOL/L — SIGNIFICANT CHANGE UP (ref 3.5–5.3)
RBC # BLD: 4.56 M/UL — SIGNIFICANT CHANGE UP (ref 4.2–5.8)
RBC # FLD: 12.9 % — SIGNIFICANT CHANGE UP (ref 10.3–14.5)
SODIUM SERPL-SCNC: 140 MMOL/L — SIGNIFICANT CHANGE UP (ref 135–145)
WBC # BLD: 10.2 K/UL — SIGNIFICANT CHANGE UP (ref 3.8–10.5)
WBC # FLD AUTO: 10.2 K/UL — SIGNIFICANT CHANGE UP (ref 3.8–10.5)

## 2020-12-04 PROCEDURE — 74240 X-RAY XM UPR GI TRC 1CNTRST: CPT

## 2020-12-04 PROCEDURE — 87635 SARS-COV-2 COVID-19 AMP PRB: CPT

## 2020-12-04 PROCEDURE — 85027 COMPLETE CBC AUTOMATED: CPT

## 2020-12-04 PROCEDURE — 80053 COMPREHEN METABOLIC PANEL: CPT

## 2020-12-04 PROCEDURE — 84132 ASSAY OF SERUM POTASSIUM: CPT

## 2020-12-04 PROCEDURE — 82803 BLOOD GASES ANY COMBINATION: CPT

## 2020-12-04 PROCEDURE — 71045 X-RAY EXAM CHEST 1 VIEW: CPT

## 2020-12-04 PROCEDURE — 96374 THER/PROPH/DIAG INJ IV PUSH: CPT | Mod: XU

## 2020-12-04 PROCEDURE — 86803 HEPATITIS C AB TEST: CPT

## 2020-12-04 PROCEDURE — 80048 BASIC METABOLIC PNL TOTAL CA: CPT

## 2020-12-04 PROCEDURE — 83036 HEMOGLOBIN GLYCOSYLATED A1C: CPT

## 2020-12-04 PROCEDURE — 84484 ASSAY OF TROPONIN QUANT: CPT

## 2020-12-04 PROCEDURE — 83690 ASSAY OF LIPASE: CPT

## 2020-12-04 PROCEDURE — 85730 THROMBOPLASTIN TIME PARTIAL: CPT

## 2020-12-04 PROCEDURE — 84100 ASSAY OF PHOSPHORUS: CPT

## 2020-12-04 PROCEDURE — 99233 SBSQ HOSP IP/OBS HIGH 50: CPT | Mod: GC

## 2020-12-04 PROCEDURE — 94640 AIRWAY INHALATION TREATMENT: CPT

## 2020-12-04 PROCEDURE — 99406 BEHAV CHNG SMOKING 3-10 MIN: CPT | Mod: GC

## 2020-12-04 PROCEDURE — 85610 PROTHROMBIN TIME: CPT

## 2020-12-04 PROCEDURE — 83735 ASSAY OF MAGNESIUM: CPT

## 2020-12-04 PROCEDURE — 80061 LIPID PANEL: CPT

## 2020-12-04 PROCEDURE — 86901 BLOOD TYPING SEROLOGIC RH(D): CPT

## 2020-12-04 PROCEDURE — 84295 ASSAY OF SERUM SODIUM: CPT

## 2020-12-04 PROCEDURE — 93005 ELECTROCARDIOGRAM TRACING: CPT

## 2020-12-04 PROCEDURE — 85025 COMPLETE CBC W/AUTO DIFF WBC: CPT

## 2020-12-04 PROCEDURE — 99285 EMERGENCY DEPT VISIT HI MDM: CPT | Mod: 25

## 2020-12-04 PROCEDURE — 81003 URINALYSIS AUTO W/O SCOPE: CPT

## 2020-12-04 PROCEDURE — 86900 BLOOD TYPING SEROLOGIC ABO: CPT

## 2020-12-04 PROCEDURE — 87338 HPYLORI STOOL AG IA: CPT

## 2020-12-04 PROCEDURE — 87086 URINE CULTURE/COLONY COUNT: CPT

## 2020-12-04 PROCEDURE — 83605 ASSAY OF LACTIC ACID: CPT

## 2020-12-04 PROCEDURE — 82962 GLUCOSE BLOOD TEST: CPT

## 2020-12-04 PROCEDURE — 99232 SBSQ HOSP IP/OBS MODERATE 35: CPT

## 2020-12-04 PROCEDURE — 86850 RBC ANTIBODY SCREEN: CPT

## 2020-12-04 PROCEDURE — 83880 ASSAY OF NATRIURETIC PEPTIDE: CPT

## 2020-12-04 PROCEDURE — 36415 COLL VENOUS BLD VENIPUNCTURE: CPT

## 2020-12-04 PROCEDURE — 82330 ASSAY OF CALCIUM: CPT

## 2020-12-04 PROCEDURE — 74177 CT ABD & PELVIS W/CONTRAST: CPT

## 2020-12-04 RX ORDER — PANTOPRAZOLE SODIUM 20 MG/1
1 TABLET, DELAYED RELEASE ORAL
Qty: 60 | Refills: 0
Start: 2020-12-04 | End: 2021-01-02

## 2020-12-04 RX ORDER — MAGNESIUM SULFATE 500 MG/ML
2 VIAL (ML) INJECTION ONCE
Refills: 0 | Status: DISCONTINUED | OUTPATIENT
Start: 2020-12-04 | End: 2020-12-04

## 2020-12-04 RX ORDER — CLARITHROMYCIN 500 MG
1 TABLET ORAL
Qty: 20 | Refills: 0
Start: 2020-12-04 | End: 2020-12-13

## 2020-12-04 RX ORDER — ERYTHROMYCIN BASE 5 MG/GRAM
1 OINTMENT (GRAM) OPHTHALMIC (EYE)
Qty: 3.5 | Refills: 0
Start: 2020-12-04 | End: 2020-12-05

## 2020-12-04 RX ORDER — MAGNESIUM SULFATE 500 MG/ML
2 VIAL (ML) INJECTION ONCE
Refills: 0 | Status: COMPLETED | OUTPATIENT
Start: 2020-12-04 | End: 2020-12-04

## 2020-12-04 RX ORDER — POTASSIUM CHLORIDE 20 MEQ
20 PACKET (EA) ORAL ONCE
Refills: 0 | Status: COMPLETED | OUTPATIENT
Start: 2020-12-04 | End: 2020-12-04

## 2020-12-04 RX ORDER — AMOXICILLIN 250 MG/5ML
2 SUSPENSION, RECONSTITUTED, ORAL (ML) ORAL
Qty: 40 | Refills: 0
Start: 2020-12-04 | End: 2020-12-13

## 2020-12-04 RX ORDER — POTASSIUM CHLORIDE 20 MEQ
10 PACKET (EA) ORAL ONCE
Refills: 0 | Status: DISCONTINUED | OUTPATIENT
Start: 2020-12-04 | End: 2020-12-04

## 2020-12-04 RX ORDER — FLUCONAZOLE 150 MG/1
2 TABLET ORAL
Qty: 20 | Refills: 0
Start: 2020-12-04 | End: 2020-12-13

## 2020-12-04 RX ORDER — NICOTINE POLACRILEX 2 MG
1 GUM BUCCAL
Qty: 7 | Refills: 0
Start: 2020-12-04 | End: 2020-12-10

## 2020-12-04 RX ADMIN — Medication 1 APPLICATION(S): at 06:45

## 2020-12-04 RX ADMIN — FLUCONAZOLE 400 MILLIGRAM(S): 150 TABLET ORAL at 06:45

## 2020-12-04 RX ADMIN — HEPARIN SODIUM 5000 UNIT(S): 5000 INJECTION INTRAVENOUS; SUBCUTANEOUS at 06:45

## 2020-12-04 RX ADMIN — HEPARIN SODIUM 5000 UNIT(S): 5000 INJECTION INTRAVENOUS; SUBCUTANEOUS at 16:40

## 2020-12-04 RX ADMIN — Medication 500 MILLIGRAM(S): at 06:46

## 2020-12-04 RX ADMIN — Medication 20 MILLIEQUIVALENT(S): at 12:26

## 2020-12-04 RX ADMIN — Medication 20 MILLIEQUIVALENT(S): at 11:37

## 2020-12-04 RX ADMIN — Medication 50 GRAM(S): at 11:38

## 2020-12-04 RX ADMIN — PANTOPRAZOLE SODIUM 40 MILLIGRAM(S): 20 TABLET, DELAYED RELEASE ORAL at 16:41

## 2020-12-04 RX ADMIN — Medication 1 APPLICATION(S): at 16:40

## 2020-12-04 RX ADMIN — Medication 1000 MILLIGRAM(S): at 06:45

## 2020-12-04 RX ADMIN — PANTOPRAZOLE SODIUM 40 MILLIGRAM(S): 20 TABLET, DELAYED RELEASE ORAL at 06:44

## 2020-12-04 RX ADMIN — Medication 1 PATCH: at 11:52

## 2020-12-04 NOTE — DISCHARGE NOTE PROVIDER - CARE PROVIDER_API CALL
Shalom Polanco)  Surgery  [address] [address]  Phone: [phone]  Fax: [phone]  Follow Up Time:

## 2020-12-04 NOTE — PROGRESS NOTE ADULT - SUBJECTIVE AND OBJECTIVE BOX
Non-sx management of contained gastric perforation.    Vital Signs Last 24 Hrs  T(C): 36.8 (04 Dec 2020 09:07), Max: 37 (03 Dec 2020 15:56)  T(F): 98.2 (04 Dec 2020 09:07), Max: 98.6 (03 Dec 2020 15:56)  HR: 65 (04 Dec 2020 09:07) (56 - 72)  BP: 124/80 (04 Dec 2020 09:07) (107/68 - 124/80)  BP(mean): 84 (03 Dec 2020 12:42) (84 - 84)  RR: 16 (04 Dec 2020 09:07) (16 - 17)  SpO2: 95% (04 Dec 2020 09:07) (92% - 98%)    I&O's Summary    03 Dec 2020 07:01  -  04 Dec 2020 07:00  --------------------------------------------------------  IN: 2036 mL / OUT: 1500 mL / NET: 536 mL    04 Dec 2020 07:01  -  04 Dec 2020 09:42  --------------------------------------------------------  IN: 200 mL / OUT: 0 mL / NET: 200 mL        SUBJECTIVE: Pt seen and examined at bedside. No acute overnight events reported. Pt states that he is tolerating full liquid diet and admits to some abdominal pain. Denies N/V/ dizziness/SOB.       Physical Exam:  General Appearance: Appears well, NAD  Neck: Supple  Chest: Equal expansion bilaterally, equal breath sounds  CV: Pulse regular presently  Abdomen: Soft, nontense  Extremities: Grossly symmetric, SCD's in place     LABS:                        13.2   10.20 )-----------( 290      ( 04 Dec 2020 05:59 )             40.7     12-04    140  |  104  |  3<L>  ----------------------------<  79  3.6   |  24  |  0.81    Ca    7.9<L>      04 Dec 2020 05:59  Phos  3.6     12-04  Mg     1.8     12-04            RADIOLOGY & ADDITIONAL STUDIES:

## 2020-12-04 NOTE — DISCHARGE NOTE PROVIDER - NSDCCPCAREPLAN_GEN_ALL_CORE_FT
PRINCIPAL DISCHARGE DIAGNOSIS  Diagnosis: Gastric ulcer with perforation, unspecified chronicity  Assessment and Plan of Treatment: Please follow up with Dr. Polanco in 2 weeks; you may call the office to make an appointment at your earliest convenience.        SECONDARY DISCHARGE DIAGNOSES  Diagnosis: Moderate protein-calorie malnutrition  Assessment and Plan of Treatment: Nutrition following    Diagnosis: Chest pain  Assessment and Plan of Treatment: Resolved    Diagnosis: Bradycardia  Assessment and Plan of Treatment: EKG showing sinus bradycardia, pt asymptomatic, can f/u as an outpatient.    Diagnosis: CKD (chronic kidney disease) stage 2, GFR 60-89 ml/min  Assessment and Plan of Treatment: - At baseline    Diagnosis: Smokes 1 pack of cigarettes per day  Assessment and Plan of Treatment: Smokes 1 ppd. I spent >3 minutes in counseling for tobacco cessation several times throughout his stay    Diagnosis: Leukocytosis  Assessment and Plan of Treatment: -Resolved    Diagnosis: Superior mesenteric artery stenosis  Assessment and Plan of Treatment: - Incidental finding on CT A/P, however with good reconstition  [facility]  - [facility]/u out[facility]    Diagnosis: Acute gastric ulcer with perforation  Assessment and Plan of Treatment: - Perforation contained, pt stable, and managed non-operatively  - Currently on H. Pylori therapy.

## 2020-12-04 NOTE — DISCHARGE NOTE PROVIDER - NSDCMRMEDTOKEN_GEN_ALL_CORE_FT
amoxicillin 500 mg oral capsule: 2 cap(s) orally 2 times a day through 12/14/20.   clarithromycin 500 mg oral tablet: 1 tab(s) orally 2 times a day through 12/14/20  erythromycin 0.5% ophthalmic ointment: 1 application to each affected eye every 8 hours through 12/6/20  fluconazole 200 mg oral tablet: 2 tab(s) orally every 24 hours thorugh 12/14/20  pantoprazole 40 mg oral delayed release tablet: 1 tab(s) orally 2 times a day

## 2020-12-04 NOTE — PROGRESS NOTE ADULT - REASON FOR ADMISSION
gastric perforation

## 2020-12-04 NOTE — DISCHARGE NOTE NURSING/CASE MANAGEMENT/SOCIAL WORK - PATIENT PORTAL LINK FT
You can access the FollowMyHealth Patient Portal offered by [facility] by registering at the following website: [email or web address]. By joining [email or web address]’s FollowMyHealth portal, you will also be able to view your health information using other applications (apps) compatible with our system.

## 2020-12-04 NOTE — DISCHARGE NOTE PROVIDER - HOSPITAL COURSE
This is a 59 yo M with no PMH, extensive smoking hx (44 year 1PPD), no PSH who presented to the ED with 5 day history of acute onset sharp abdominal pain. Of note, pt underwent EGD/colonoscopy ~3 weeks ago that was significant for gastric ulcer that was not bleeding or perforated. He states he was not put on any medication for the ulcer. Colonoscopy was negative. In ED, pt is afebrile, HDS. Labs are significant for WBC 13.77. CT scan shows [id number] debris and air-filled thick-walled structure arising from 1.4x1.1cm wall defect along lesser curvature, no extravasation of PO contrast, no pneumoperitoneum. NGT was placed and pt was started on H.pylori treatment and diflucan. Pt was managed non-operatively and serial abdominal exams remained stable. On 12/1 pt underwent UGI which showed a gastric ulcer of the lesser curvature without evidence of extravasation, NGT was removed and pt was advanced to clears. His postoperative course was unremarkable with advancement of diet, bowel function and resolution of pain.. On day of discharge patient was stable to be d/c'd home with amoxicillin, clarithromycin, fluconazole, for a complete course of 2 weeks and PPI BID with plans to follow up with Dr. Polanco in 2 weeks.

## 2020-12-04 NOTE — DISCHARGE NOTE PROVIDER - NSDCFUADDINST_GEN_ALL_CORE_FT
Please follow up with Dr. Polanco in 2 weeks; you may call the office to make an appointment at your earliest convenience. Along with your gastric ulcer, you were also noted to have stenosis of you superior mesenteric artery, however you continue to have good blood flow and this condition is likely chronic. You may follow up with Dr. Polanco regarding this.     We STRONGLY recommend tobacco smoking cessation therapy. Tobacco use can be extremely harmful for your gastric ulcer wound healing that could potentially become fatal.     General Discharge Instructions:  Please resume all regular home medications unless specifically advised not to take a particular medication. Also, please take any new medications as prescribed. Please continue taking Amoxicillin 1000mg twice a day, Clarithromycin 500mg twice a day, and Fluconazole 400 mg once a day through 12/14/20. Please continue to take Protonix 40mg twice a day indefinitely until instructed otherwise.   Please continue to use the eye ointment until 12/6/20 for your eye infection.   Please get plenty of rest, continue to ambulate several times per day, and drink adequate amounts of fluids. Avoid lifting weights greater than 5-10 lbs until you follow-up with your surgeon, who will instruct you further regarding activity restrictions.  Avoid driving or operating heavy machinery while taking pain medications.  Please follow-up with your surgeon and Primary Care Provider (PCP) as advised.        Warning Signs:  Please call your doctor if you experience the following:  *You experience new chest pain, pressure, squeezing or tightness.  *New or worsening cough, shortness of breath, or wheeze.  *If you are vomiting and cannot keep down fluids or your medications.  *You are getting dehydrated due to continued vomiting, diarrhea, or other reasons. Signs of dehydration include dry mouth, rapid heartbeat, or feeling dizzy or faint when standing.  *You see blood or dark/black material when you vomit or have a bowel movement.  *You experience burning when you urinate, have blood in your urine, or experience a discharge.  *Your pain is not improving within 8-12 hours or is not gone within 24 hours. Call or return immediately if your pain is getting worse, changes location, or moves to your chest or back.  *You have shaking chills, or fever greater than 101.5 degrees Fahrenheit or 38 degrees Celsius.  *Any change in your symptoms, or any new symptoms that concern you.

## 2020-12-04 NOTE — PROGRESS NOTE ADULT - SUBJECTIVE AND OBJECTIVE BOX
INTERVAL HPI/OVERNIGHT EVENTS: None    Subjective:  Pt has no acute complaints. Reports pain is improved. Tolerating diet well. Denies N/V. Denies dizziness, light headedness, CP, SOB. 12 pt ROS is otherwise negative.     Vital Signs Last 24 Hrs  T(C): 36.8 (04 Dec 2020 09:07), Max: 37 (03 Dec 2020 15:56)  T(F): 98.2 (04 Dec 2020 09:07), Max: 98.6 (03 Dec 2020 15:56)  HR: 65 (04 Dec 2020 09:07) (56 - 72)  BP: 124/80 (04 Dec 2020 09:07) (107/68 - 124/80)  BP(mean): 84 (03 Dec 2020 12:42) (84 - 84)  RR: 16 (04 Dec 2020 09:07) (16 - 17)  SpO2: 95% (04 Dec 2020 09:07) (92% - 98%)    PHYSICAL EXAM:    Constitutional: NAD  Eyes: PERRLA  ENMT: MMM  Neck: supple  Respiratory: CTA b/l  Cardiovascular: bradycardic, s1s2, no m/r/g  Gastrointestinal: soft, mild TTP  Extremities: wwp  Vascular: + 2 pulses radial  Neurological: AAO x 4  Musculoskeletal: no joint swelling  Skin: no rash  Psych: normal affect    MEDICATIONS  (STANDING):  amoxicillin 1000 milliGRAM(s) Oral two times a day  clarithromycin 500 milliGRAM(s) Oral two times a day  erythromycin   Ointment 1 Application(s) Both EYES every 8 hours  fluconAZOLE   Tablet 400 milliGRAM(s) Oral every 24 hours  heparin   Injectable 5000 Unit(s) SubCutaneous every 8 hours  influenza   Vaccine 0.5 milliLiter(s) IntraMuscular once  magnesium sulfate  IVPB 2 Gram(s) IV Intermittent once  nicotine -  14 mG/24Hr(s) Patch 1 patch Transdermal daily  pantoprazole    Tablet 40 milliGRAM(s) Oral two times a day  potassium chloride  10 mEq/50 mL IVPB 10 milliEquivalent(s) IV Intermittent once    MEDICATIONS  (PRN):  albuterol/ipratropium for Nebulization 3 milliLiter(s) Nebulizer every 6 hours PRN Shortness of Breath and/or Wheezing          Allergies    No Known Allergies    Intolerances        LABS:                                              13.2   10.20 )-----------( 290      ( 04 Dec 2020 05:59 )             40.7   12-04    140  |  104  |  3<L>  ----------------------------<  79  3.6   |  24  |  0.81    Ca    7.9<L>      04 Dec 2020 05:59  Phos  3.6     12-04  Mg     1.8     12-04                Color: Yellow / Appearance: Clear / SG: <=1.005 / pH: x  Gluc: x / Ketone: NEGATIVE  / Bili: Negative / Urobili: 0.2 E.U./dL   Blood: x / Protein: NEGATIVE mg/dL / Nitrite: NEGATIVE   Leuk Esterase: NEGATIVE / RBC: x / WBC x   Sq Epi: x / Non Sq Epi: x / Bacteria: x        RADIOLOGY & ADDITIONAL TESTS:  < from: CT Abdomen and Pelvis w/ Oral Cont and w/ IV Cont (11.29.20 @ 03:00) >  Lower chest: Right lower lobe bronchial wall thickening with mucus plugging in the medial lobe and subsegmental atelectasis distally, consistent with small airways disease. Linear atelectasis versus scarring in the right middle lobe.    < end of copied text >  < from: CT Abdomen and Pelvis w/ Oral Cont and w/ IV Cont (11.29.20 @ 03:00) >  Impression:  4 cm air and debris-filled thick-walled structure arises from a 1.4 cm defect along the lesser curvature of the stomach with surrounding fat infiltration is consistent with a contained perforation, probably related to gastric ulcer. Trace scattered ascites. No organized collections or free air within the peritoneum.    < end of copied text >  < from: CT Abdomen and Pelvis w/ Oral Cont and w/ IV Cont (11.29.20 @ 03:00) >  Vessels: Moderate noncalcified and calcified plaque of the infrarenal abdominal aorta. Apparent single celiac-mesenteric trunk with a small caliber SMA arising inferiorly. Questionable severe narrowing versus occlusion of the proximal SMA branch (series 4 image 31, series 3 image 33) but with immediate reconstitution distally.    < end of copied text >

## 2020-12-04 NOTE — PROGRESS NOTE ADULT - ASSESSMENT
A/P: 59 yo M no PMH/PSH, extensive smoking history presents with contained gastric perforation.     FLD  Pain/Nausea control PRN  H.Pylori Triple therapy  PO Fluconazole 12/3. Clarithromycin (12/2- ), Amoxicillin (12/2-), s/p Zosyn and flagyl (11/29-12/2) & IV Fluconazole (11/29- ),  Protonix 40 BID (11/29)  Provided education on smoking cessation   OOBA/SCD/SQH/IS

## 2020-12-08 DIAGNOSIS — N18.2 CHRONIC KIDNEY DISEASE, STAGE 2 (MILD): ICD-10-CM

## 2020-12-08 DIAGNOSIS — D72.829 ELEVATED WHITE BLOOD CELL COUNT, UNSPECIFIED: ICD-10-CM

## 2020-12-08 DIAGNOSIS — R10.9 UNSPECIFIED ABDOMINAL PAIN: ICD-10-CM

## 2020-12-08 DIAGNOSIS — Z72.0 TOBACCO USE: ICD-10-CM

## 2020-12-08 DIAGNOSIS — Z85.46 PERSONAL HISTORY OF MALIGNANT NEOPLASM OF PROSTATE: ICD-10-CM

## 2020-12-08 DIAGNOSIS — K25.1 ACUTE GASTRIC ULCER WITH PERFORATION: ICD-10-CM

## 2020-12-08 DIAGNOSIS — E44.0 MODERATE PROTEIN-CALORIE MALNUTRITION: ICD-10-CM

## 2020-12-08 DIAGNOSIS — R00.1 BRADYCARDIA, UNSPECIFIED: ICD-10-CM

## 2020-12-08 DIAGNOSIS — I95.9 HYPOTENSION, UNSPECIFIED: ICD-10-CM

## 2020-12-08 DIAGNOSIS — K55.1 CHRONIC VASCULAR DISORDERS OF INTESTINE: ICD-10-CM

## 2020-12-09 PROBLEM — C61 MALIGNANT NEOPLASM OF PROSTATE: Chronic | Status: ACTIVE | Noted: 2020-11-29

## 2020-12-15 ENCOUNTER — APPOINTMENT (OUTPATIENT)
Dept: BARIATRICS | Facility: CLINIC | Age: 60
End: 2020-12-15
Payer: MEDICAID

## 2020-12-15 ENCOUNTER — LABORATORY RESULT (OUTPATIENT)
Age: 60
End: 2020-12-15

## 2020-12-15 VITALS
WEIGHT: 141 LBS | HEIGHT: 70 IN | OXYGEN SATURATION: 96 % | HEART RATE: 62 BPM | DIASTOLIC BLOOD PRESSURE: 73 MMHG | TEMPERATURE: 97.2 F | BODY MASS INDEX: 20.19 KG/M2 | SYSTOLIC BLOOD PRESSURE: 109 MMHG

## 2020-12-15 PROCEDURE — 99214 OFFICE O/P EST MOD 30 MIN: CPT

## 2020-12-15 PROCEDURE — 99072 ADDL SUPL MATRL&STAF TM PHE: CPT

## 2021-02-04 NOTE — PROGRESS NOTE ADULT - PROBLEM/PLAN-2
Problem: Falls - Risk of  Goal: *Absence of Falls  Description: Document Clydene Bone Fall Risk and appropriate interventions in the flowsheet.   Outcome: Progressing Towards Goal  Note: Fall Risk Interventions:  Mobility Interventions: Communicate number of staff needed for ambulation/transfer, Patient to call before getting OOB         Medication Interventions: Assess postural VS orthostatic hypotension, Patient to call before getting OOB, Teach patient to arise slowly         History of Falls Interventions: Door open when patient unattended, Evaluate medications/consider consulting pharmacy, Investigate reason for fall, Room close to nurse's station DISPLAY PLAN FREE TEXT

## 2021-02-05 ENCOUNTER — EMERGENCY (EMERGENCY)
Facility: HOSPITAL | Age: 61
LOS: 1 days | Discharge: HOME | End: 2021-02-05
Attending: EMERGENCY MEDICINE | Admitting: EMERGENCY MEDICINE
Payer: COMMERCIAL

## 2021-02-05 VITALS
RESPIRATION RATE: 17 BRPM | HEIGHT: 68 IN | SYSTOLIC BLOOD PRESSURE: 121 MMHG | HEART RATE: 80 BPM | TEMPERATURE: 98 F | DIASTOLIC BLOOD PRESSURE: 77 MMHG | OXYGEN SATURATION: 95 %

## 2021-02-05 VITALS
HEART RATE: 50 BPM | DIASTOLIC BLOOD PRESSURE: 57 MMHG | RESPIRATION RATE: 18 BRPM | OXYGEN SATURATION: 95 % | SYSTOLIC BLOOD PRESSURE: 115 MMHG | TEMPERATURE: 98 F

## 2021-02-05 DIAGNOSIS — Z90.49 ACQUIRED ABSENCE OF OTHER SPECIFIED PARTS OF DIGESTIVE TRACT: Chronic | ICD-10-CM

## 2021-02-05 DIAGNOSIS — R10.33 PERIUMBILICAL PAIN: ICD-10-CM

## 2021-02-05 DIAGNOSIS — R07.89 OTHER CHEST PAIN: ICD-10-CM

## 2021-02-05 DIAGNOSIS — U07.1 COVID-19: ICD-10-CM

## 2021-02-05 LAB
ALBUMIN SERPL ELPH-MCNC: 4 G/DL — SIGNIFICANT CHANGE UP (ref 3.3–5)
ALP SERPL-CCNC: 98 U/L — SIGNIFICANT CHANGE UP (ref 40–120)
ALT FLD-CCNC: 10 U/L — SIGNIFICANT CHANGE UP (ref 10–45)
ANION GAP SERPL CALC-SCNC: 19 MMOL/L — HIGH (ref 5–17)
APTT BLD: 27.1 SEC — LOW (ref 27.5–35.5)
AST SERPL-CCNC: 18 U/L — SIGNIFICANT CHANGE UP (ref 10–40)
BASOPHILS # BLD AUTO: 0.04 K/UL — SIGNIFICANT CHANGE UP (ref 0–0.2)
BASOPHILS NFR BLD AUTO: 0.3 % — SIGNIFICANT CHANGE UP (ref 0–2)
BILIRUB SERPL-MCNC: 1.2 MG/DL — SIGNIFICANT CHANGE UP (ref 0.2–1.2)
BUN SERPL-MCNC: 14 MG/DL — SIGNIFICANT CHANGE UP (ref 7–23)
CALCIUM SERPL-MCNC: 9.6 MG/DL — SIGNIFICANT CHANGE UP (ref 8.4–10.5)
CHLORIDE SERPL-SCNC: 100 MMOL/L — SIGNIFICANT CHANGE UP (ref 96–108)
CO2 SERPL-SCNC: 24 MMOL/L — SIGNIFICANT CHANGE UP (ref 22–31)
CREAT SERPL-MCNC: 0.96 MG/DL — SIGNIFICANT CHANGE UP (ref 0.5–1.3)
EOSINOPHIL # BLD AUTO: 0.04 K/UL — SIGNIFICANT CHANGE UP (ref 0–0.5)
EOSINOPHIL NFR BLD AUTO: 0.3 % — SIGNIFICANT CHANGE UP (ref 0–6)
GLUCOSE SERPL-MCNC: 97 MG/DL — SIGNIFICANT CHANGE UP (ref 70–99)
HCT VFR BLD CALC: 41.9 % — SIGNIFICANT CHANGE UP (ref 39–50)
HGB BLD-MCNC: 13.8 G/DL — SIGNIFICANT CHANGE UP (ref 13–17)
IMM GRANULOCYTES NFR BLD AUTO: 0.3 % — SIGNIFICANT CHANGE UP (ref 0–1.5)
INR BLD: 1.2 — HIGH (ref 0.88–1.16)
LIDOCAIN IGE QN: 9 U/L — SIGNIFICANT CHANGE UP (ref 7–60)
LYMPHOCYTES # BLD AUTO: 1.44 K/UL — SIGNIFICANT CHANGE UP (ref 1–3.3)
LYMPHOCYTES # BLD AUTO: 11.3 % — LOW (ref 13–44)
MCHC RBC-ENTMCNC: 29.4 PG — SIGNIFICANT CHANGE UP (ref 27–34)
MCHC RBC-ENTMCNC: 32.9 GM/DL — SIGNIFICANT CHANGE UP (ref 32–36)
MCV RBC AUTO: 89.1 FL — SIGNIFICANT CHANGE UP (ref 80–100)
MONOCYTES # BLD AUTO: 0.81 K/UL — SIGNIFICANT CHANGE UP (ref 0–0.9)
MONOCYTES NFR BLD AUTO: 6.4 % — SIGNIFICANT CHANGE UP (ref 2–14)
NEUTROPHILS # BLD AUTO: 10.35 K/UL — HIGH (ref 1.8–7.4)
NEUTROPHILS NFR BLD AUTO: 81.4 % — HIGH (ref 43–77)
NRBC # BLD: 0 /100 WBCS — SIGNIFICANT CHANGE UP (ref 0–0)
PLATELET # BLD AUTO: 426 K/UL — HIGH (ref 150–400)
POTASSIUM SERPL-MCNC: 4.2 MMOL/L — SIGNIFICANT CHANGE UP (ref 3.5–5.3)
POTASSIUM SERPL-SCNC: 4.2 MMOL/L — SIGNIFICANT CHANGE UP (ref 3.5–5.3)
PROT SERPL-MCNC: 8.1 G/DL — SIGNIFICANT CHANGE UP (ref 6–8.3)
PROTHROM AB SERPL-ACNC: 14.3 SEC — HIGH (ref 10.6–13.6)
RBC # BLD: 4.7 M/UL — SIGNIFICANT CHANGE UP (ref 4.2–5.8)
RBC # FLD: 13.2 % — SIGNIFICANT CHANGE UP (ref 10.3–14.5)
SARS-COV-2 RNA SPEC QL NAA+PROBE: DETECTED
SODIUM SERPL-SCNC: 143 MMOL/L — SIGNIFICANT CHANGE UP (ref 135–145)
TROPONIN T SERPL-MCNC: <0.01 NG/ML — SIGNIFICANT CHANGE UP (ref 0–0.01)
WBC # BLD: 12.72 K/UL — HIGH (ref 3.8–10.5)
WBC # FLD AUTO: 12.72 K/UL — HIGH (ref 3.8–10.5)

## 2021-02-05 PROCEDURE — 85025 COMPLETE CBC W/AUTO DIFF WBC: CPT

## 2021-02-05 PROCEDURE — 80053 COMPREHEN METABOLIC PANEL: CPT

## 2021-02-05 PROCEDURE — 85730 THROMBOPLASTIN TIME PARTIAL: CPT

## 2021-02-05 PROCEDURE — 71045 X-RAY EXAM CHEST 1 VIEW: CPT | Mod: 26

## 2021-02-05 PROCEDURE — 99285 EMERGENCY DEPT VISIT HI MDM: CPT

## 2021-02-05 PROCEDURE — U0005: CPT

## 2021-02-05 PROCEDURE — 99284 EMERGENCY DEPT VISIT MOD MDM: CPT | Mod: 25

## 2021-02-05 PROCEDURE — 75574 CT ANGIO HRT W/3D IMAGE: CPT

## 2021-02-05 PROCEDURE — U0003: CPT

## 2021-02-05 PROCEDURE — 85610 PROTHROMBIN TIME: CPT

## 2021-02-05 PROCEDURE — G1004: CPT

## 2021-02-05 PROCEDURE — 96374 THER/PROPH/DIAG INJ IV PUSH: CPT | Mod: XU

## 2021-02-05 PROCEDURE — 36415 COLL VENOUS BLD VENIPUNCTURE: CPT

## 2021-02-05 PROCEDURE — 71045 X-RAY EXAM CHEST 1 VIEW: CPT

## 2021-02-05 PROCEDURE — 75574 CT ANGIO HRT W/3D IMAGE: CPT | Mod: 26,MF

## 2021-02-05 PROCEDURE — 96375 TX/PRO/DX INJ NEW DRUG ADDON: CPT | Mod: XU

## 2021-02-05 PROCEDURE — 83690 ASSAY OF LIPASE: CPT

## 2021-02-05 PROCEDURE — 84484 ASSAY OF TROPONIN QUANT: CPT

## 2021-02-05 RX ORDER — SODIUM CHLORIDE 9 MG/ML
1000 INJECTION INTRAMUSCULAR; INTRAVENOUS; SUBCUTANEOUS ONCE
Refills: 0 | Status: COMPLETED | OUTPATIENT
Start: 2021-02-05 | End: 2021-02-05

## 2021-02-05 RX ORDER — FAMOTIDINE 10 MG/ML
20 INJECTION INTRAVENOUS ONCE
Refills: 0 | Status: COMPLETED | OUTPATIENT
Start: 2021-02-05 | End: 2021-02-05

## 2021-02-05 RX ORDER — SODIUM CHLORIDE 9 MG/ML
500 INJECTION INTRAMUSCULAR; INTRAVENOUS; SUBCUTANEOUS ONCE
Refills: 0 | Status: DISCONTINUED | OUTPATIENT
Start: 2021-02-05 | End: 2021-02-05

## 2021-02-05 RX ORDER — PANTOPRAZOLE SODIUM 20 MG/1
40 TABLET, DELAYED RELEASE ORAL ONCE
Refills: 0 | Status: COMPLETED | OUTPATIENT
Start: 2021-02-05 | End: 2021-02-05

## 2021-02-05 RX ADMIN — PANTOPRAZOLE SODIUM 40 MILLIGRAM(S): 20 TABLET, DELAYED RELEASE ORAL at 08:34

## 2021-02-05 RX ADMIN — Medication 30 MILLILITER(S): at 06:35

## 2021-02-05 RX ADMIN — FAMOTIDINE 20 MILLIGRAM(S): 10 INJECTION INTRAVENOUS at 06:30

## 2021-02-05 RX ADMIN — SODIUM CHLORIDE 1000 MILLILITER(S): 9 INJECTION INTRAMUSCULAR; INTRAVENOUS; SUBCUTANEOUS at 07:40

## 2021-02-05 NOTE — ED PROVIDER NOTE - OBJECTIVE STATEMENT
61 yo m admitted in December 2020 for nonbleeding, nonperf gastric ulcer; completed tx course for H. pylori about 2-3 weeks ago. He says the abdominal pain from his ulcer had resolved during his treatment, but it began to recur in the past 3 days.  The pain is sharp and periumbilical, similar to the pain in December.  Does not radiate to the back.  He vomited once last night; "white vomit" without blood/coffee ground appearance.  Bowel movements "normal" without BRBPR or dark/tarry stool.  Says he also began to develop sharp midsternal chest pain around 2 am this morning, keeping him from sleeping.  It is also similar to pain he had in December.  He recently quit smoking.  He denies hx of CAD, DM, HTN, HLD, DVT/PE.  Denies hx of CAD/sudden death in his immediate family.  Denies any drug use.

## 2021-02-05 NOTE — ED PROVIDER NOTE - PROGRESS NOTE DETAILS
james: pt received at sign out from shakir alejo/dr wynn as pending cta coronaries for non specific epigastric/cp; pt sleeping very comfortably throughout ed stay, hemodynamically stable, cta w/4.1 thoracic aneurysmal dilatation; ct surg will eval pt to establish care james: pt received at sign out from shakir alejo/dr wynn as pending cta coronaries for non specific epigastric/cp; pt sleeping very comfortably throughout ed stay, hemodynamically stable, cta w/4.1 thoracic aneurysmal dilatation; ct surg will eval pt to establish care -- cleared for dc-all f/u info given

## 2021-02-05 NOTE — ED PROVIDER NOTE - CARE PLAN
Principal Discharge DX:	Periumbilical abdominal pain   Principal Discharge DX:	Periumbilical abdominal pain  Secondary Diagnosis:	COVID-19

## 2021-02-05 NOTE — ED ADULT NURSE REASSESSMENT NOTE - NS ED NURSE REASSESS COMMENT FT1
Pt in Teletracking for CTA. L 18g PIV placed, pt HR high 50's SB. AAOX3, IVF infusing. ECG monitoring in place.

## 2021-02-05 NOTE — ED PROVIDER NOTE - ATTENDING CONTRIBUTION TO CARE
60M hx gastric ulcer, h.pylori, c/o midsternal chest pain since 2A this morning. pt states vomited once last night.  states abd pain from ulcer had resolved since receiving treatment for h.pylori however recurred in past 3 days.  no fevers.   gen- nad  heent- ncat, clear conj  cv -rrr  lungs -ctab  abd - soft, nt, nd  ext -wwp, no edema  neuro -aox3, steady gait, vincent  abd soft, no guarding, doubt surgical abd at this time, ekg with possible q waves similar to prior. will get CT coronaries to evaluate for possible cad

## 2021-02-05 NOTE — ED PROVIDER NOTE - NSFOLLOWUPINSTRUCTIONS_ED_ALL_ED_FT
REST, DRINK LOTS OF FLUIDS, TYLENOL IF NEEDED, TAKE YOUR STOMACH MEDICATION, FOLLOW UP WITH YOUR PMD, GI AND HEART DOCTORS, FOLLOW UP WITH CARDIOTHORACIC FOR EVALUATION OF ANEURYSM AND FOLLOW UP  COVID-19 (Coronavirus Disease 2019)  WHAT YOU NEED TO KNOW:  COVID-19 is the disease caused by a coronavirus first discovered in 2019. Coronaviruses generally cause upper respiratory (nose, throat, and lung) infections, such as a cold. The new virus can also cause serious lower respiratory conditions, such as pneumonia or acute respiratory distress syndrome (ARDS). The new virus can also affect many other organs, including the brain and heart. Blood vessels can also be affected, leading to blood clots. Anyone can develop serious problems from the new virus, but your risk is higher if you are 65 or older. A weak immune system, diabetes, or a heart or lung condition can also increase your risk. Your risk is also higher if you are a current or former cigarette smoker.  DISCHARGE INSTRUCTIONS:  If you think you or someone you know may be infected: Do the following to protect others:   •If emergency care is needed, tell the  about the possible infection, or call ahead and tell the emergency department.  •Call a healthcare provider for instructions if symptoms are mild. Anyone who may be infected should not arrive without calling first. The provider will need to protect staff members and other patients.  •The person who may be infected needs to wear a face covering while getting medical care. This will help lower the risk of infecting others. Coverings are not used for anyone who is younger than 2 years, has breathing problems, or cannot remove it. The provider can give you instructions for anyone who cannot wear a covering.  Call your local emergency number (911 in the [place]) or an emergency department if:   •You have trouble breathing or shortness of breath at rest.  •You have chest pain or pressure that lasts longer than 5 minutes.  •You become confused or hard to wake.  •Your lips or face are blue.  •You have a fever of 104°F (40°C) or higher.  Call your doctor if:   •You do not have symptoms of COVID-19 but had close physical contact within 14 days with someone who tested positive.  •You have questions or concerns about your condition or care.  Medicines: You may need any of the following for mild symptoms:   •Decongestants help reduce nasal congestion and help you breathe more easily. If you take decongestant pills, they may make you feel restless or cause problems with your sleep. Do not use decongestant sprays for more than a few days.  •Cough suppressants help reduce coughing. Ask your healthcare provider which type of cough medicine is best for you.  •Acetaminophen decreases pain and fever. It is available without a doctor's order. Ask how much to take and how often to take it. Follow directions. Read the labels of all other medicines you are using to see if they also contain acetaminophen, or ask your doctor or pharmacist. Acetaminophen can cause liver damage if not taken correctly. Do not use more than 4 grams (4,000 milligrams) total of acetaminophen in one day.   •NSAIDs, such as ibuprofen, help decrease swelling, pain, and fever. NSAIDs can cause stomach bleeding or kidney problems in certain people. If you take blood thinner medicine, always ask your healthcare provider if NSAIDs are safe for you. Always read the medicine label and follow directions.  •Take your medicine as directed. Contact your healthcare provider if you think your medicine is not helping or if you have side effects. Tell him or her if you are allergic to any medicine. Keep a list of the medicines, vitamins, and herbs you take. Include the amounts, and when and why you take them. Bring the list or the pill bottles to follow-up visits. Carry your medicine list with you in case of an emergency.  How the 2019 coronavirus spreads: The virus spreads quickly and easily. The virus can be passed starting 2 days before symptoms begin or before a positive test if symptoms never begin. The following are ways the virus is thought to spread, but more information may be coming:   •Droplets are the main way all coronaviruses spread. The virus travels in droplets that form when a person talks, coughs, or sneezes. The droplets can also float in the air for minutes or hours. Infection happens when you breathe in the droplets or get them in your eyes or nose. Close personal contact with an infected person increases your risk for infection. This means being within 6 feet (2 meters) of the person for at least 15 minutes over 24 hours.  •Person-to-person contact can spread the virus. For example, a person with the virus on his or her hands can spread it by shaking hands with someone.  •The virus can stay on objects and surfaces for a short time. You may become infected by touching the object or surface and then touching your eyes or mouth.  •An infected animal may be able to infect a person who touches it. This may happen at live markets or on a farm.  Help lower the risk for COVID-19: The best way to prevent infection is to avoid anyone who is infected, but this can be hard to do. An infected person can spread the virus before signs or symptoms begin, or even if signs or symptoms never develop. The following can help lower the risk for infection:   Prevent COVID-19 Infection  •Wash your hands often throughout the day. Use soap and water. Rub your soapy hands together, lacing your fingers, for at least 20 seconds. Rinse with warm, running water. Dry your hands with a clean towel or paper towel. Use hand  that contains alcohol if soap and water are not available. Teach children how to wash their hands and use hand .  Handwashing  •Cover sneezes and coughs. Turn your face away and cover your mouth and nose with a tissue. Throw the tissue away. Use the bend of your arm if a tissue is not available. Then wash your hands well with soap and water or use hand . Teach children how to cover a cough or sneeze.  •Wear a face covering (mask) around anyone who does not live in your home. Use a disposable non-medical mask, or make a cloth covering with at least 2 layers. Cover your mouth and your nose. Securely fasten it under your chin and on the sides of your face. Do not wear a plastic face shield instead of a covering. Continue social distancing and washing your hands often. A face covering is not a substitute for social distancing safety measures.  How to Wear a Face Covering (Mask)  •Follow worldwide, national, and local social distancing guidelines. Keep at least 6 feet (2 meters) between you and others. Also keep this distance from anyone who comes to your home, such as someone making a delivery.  •Make a habit of not touching your face. If you get the virus on your hands, you can transfer it to your eyes, nose, or mouth and become infected. You can also transfer it to objects, surfaces, or people. Do not touch your eyes, nose, or mouth without washing your hands first.  •Clean and disinfect high-touch surfaces and objects often. Use disinfecting wipes, or make a solution of 4 teaspoons of bleach in 1 quart (4 cups) of water. Clean and disinfect even if you think no one living in or coming to your home is infected with the virus.  •Ask about vaccines you may need. The COVID-19 vaccine is a shot given to help prevent infection caused by the novel coronavirus. It is given to adults and children 16 years of age or older. Your healthcare provider can give you more information about when the vaccine may be available to you. Get the influenza (flu) vaccine as soon as recommended each year, usually starting in September or October. Get the pneumonia vaccine if recommended.  Follow social distancing guidelines: National and local social distancing rules vary. Rules may change over time as restrictions are lifted. Restrictions may return if an outbreak happens where you live. It is important to know and follow all current social distancing rules in your area. The following are general guidelines:  •Stay home if you are sick or think you may have COVID-19. It is important to stay home if you are waiting for a testing appointment or for test results. Even if you do not have symptoms, you can pass the virus to others.  •Limit trips out of your home. Have food, medicines, and other supplies delivered and left at your door or other area, if possible. Plan trips out of your home so you make the fewest stops possible to limit close personal contact. Keep track of places you go. This will help contact tracers notify others if you become infected.  •Avoid close physical contact with anyone who does not live in your home. Do not shake hands with, hug, or kiss a person as a greeting. If you must use public transportation (such as a bus or subway), try to sit or stand away from others. Only allow necessary people into your home. Wear your face covering, and remind others to wear a face covering. Remind them to wash their hands when they arrive and before they leave. Do not let someone into your home or go to someone's home just to visit. Even if you both do not feel sick, the virus can pass from one of you to the other.  •Avoid in-person gatherings and crowds. Gatherings or crowds of 10 or more individuals can cause the virus to spread. Avoid places such as sol, beaches, sporting events, and tourist attractions. For events such as parties, holiday meals, [employer] services, and conferences, attend virtually (on a computer), if possible.  •Ask your healthcare provider for other ways to have appointments. Some providers offer phone, video, or other types of appointments. You may also be able to get prescriptions for a few months of your medicines at a time.  •Stay safe if you must go out to work. Keep physical distance between you and other workers as much as possible. Follow your employer's rules so everyone stays safe.  If you have COVID-19 and are recovering at home, healthcare providers will give you specific instructions to follow. The following are general guidelines to remind you how to keep others safe until you are well:   •Wash your hands often. Use soap and water as much as possible. Use hand  that contains alcohol if soap and water are not available. Dry your hands with a clean towel or paper towel. Do not share towels with anyone. If you use paper towels, throw them away in a lined trash can kept in your room or area. Use a covered trash can, if possible.  •Do not go out of your home unless it is necessary. Ask someone who is not infected to go out for groceries or supplies, or have them delivered. Do not go to your healthcare provider's office without an appointment.  •Only have close physical contact with a person giving direct care, or a baby or child you must care for. Family members and friends should not visit you. If possible, stay in a separate area or room of your home if you live with others. No one should go into the area or room except to give you care. You can visit with others by phone, video chat, e-mail, or similar systems.  •Wear a face covering while others are near you. This can help prevent droplets from spreading the virus when you talk, sneeze, or cough. Put the covering on before anyone comes into your room or area. Remind the person to cover his or her nose and mouth before coming in to provide care for you.  •Do not share items. Do not share dishes, towels, or other items with anyone. Items need to be washed after you use them.  •Protect your baby. Some newborns have tested positive for the virus. It is not known if they became infected before or after birth. The highest risk is when a  has close contact with an infected person. If you are pregnant or breastfeeding, talk to your healthcare provider or obstetrician about any concerns you have. He or she will tell you when to bring your baby in for check-ups and vaccines. He or she will also tell you what to do if you think your baby was infected with the coronavirus. Wash your hands and put on a clean face covering before you breastfeed or care for your baby.  •Do not handle live animals unless it is necessary. Some animals, including pets, have been infected with the new coronavirus. Ask someone who is not infected to take care of your pet until you are well. If you must care for a pet, wear a face covering. Wash your hands before and after you give care. Talk to your healthcare provider about how to keep a service animal safe, if needed.  •Follow directions from your healthcare provider for being around others after you recover. It is not known if or for how long a recovered person can pass the virus to others. Your provider may give you instructions, such as continuing social distancing and wearing a face covering. He or she will tell you when it is okay to be around others again. This may be 10 to 20 days after symptoms started or you had a positive test. Most symptoms will also need to be gone. Your provider will give you more information.  Follow up with your doctor as directed: Write down your questions so you remember to ask them during your visits.  For more information:   •Centers for Disease Control and Prevention  [address] [place] [address]  Phone: 1-413.347.5926  Web Address: http://www.cdc.gov

## 2021-02-05 NOTE — ED PROVIDER NOTE - NS ED ROS FT
CONSTITUTIONAL: No fever, chills, or weakness  NEURO: No headache, no dizziness, no syncope; No focal weakness/tingling/numbness  EYES: No visual changes  ENT: No rhinorrhea or sore throat  PULM: No cough or dyspnea  CV: No palpitations, no edema  GI: HPI  : No dysuria, hematuria, frequency  MSK: No neck pain or back pain, no joint pain  SKIN: no rash or unusual bruising

## 2021-02-05 NOTE — CONSULT NOTE ADULT - ASSESSMENT
This is a 61 y/o M with a PMHX of gastric ulcer (recent admission Dec 2020, nonbleeding, completed H. Pylori course 3 weeks ago) who states he has no other past medical history. Presented to [facility] ED today on 2/5/21 complaining of sharp periumbilical pain "similar to pain in december" that began at 2:00 am this morning that awoke him from sleep with 1 episode of non-bloody, non-billious vomit. States he has normal BMs and denies any BRBPR, dark stools. Patient denies any CP, palpitations, radiation of pain to the back, arm or neck, SOB, wheezing, nausea, diarrhea, constipation, fevers or chills. In the ED, patient underwent CT of the chest which revealed incidental 4.1cm aortic aneurysm and pt states this is the first time he has heard of this before. CT surgery, Dr. Roberson, consulted for incidental aortic root aneurysm. Of note, patient also found to be COVID positive and saturating 100% on RA.    Plan:  Problem 1: Aortic Aneurysm   - Incidental finding of 4.1cm aortic aneurysm on CT today  - Patient no prior known hx of aneurysm  - asymptomatic  - Given size and asymptomatic nature of aneurysm, instructed patient to follow up with Dr. Roberson in the office for routine surveillance of aortic aneurysm    Problem 2: gastric ulcer  - recent admission for ulcer  - s/p H pylori treatment  - Needs to follow up with outpatient GI regarding gastric ulcer     Problem 3: COVID  - patient found to be COVID positive  - symptoms do not warrant hospitalization  - patient should self-isolate at home for 14 days     Problem 4: Vomiting  - likely 2/2 to either COVID or gastric ulcer  - Patient should remain well hydrated and monitor symptoms  - If symptoms worsen, patient should return to ED     Case discussed with attending and primary team.       This is a 59 y/o M with a PMHX of gastric ulcer (recent admission Dec 2020, nonbleeding, completed H. Pylori course 3 weeks ago) who states he has no other past medical history. Presented to [facility] ED today on 2/5/21 complaining of sharp periumbilical pain "similar to pain in december" that began at 2:00 am this morning that awoke him from sleep with 1 episode of non-bloody, non-billious vomit. States he has normal BMs and denies any BRBPR, dark stools. Patient denies any CP, palpitations, radiation of pain to the back, arm or neck, SOB, wheezing, nausea, diarrhea, constipation, fevers or chills. In the ED, patient underwent CT of the chest which revealed incidental 4.1cm aortic aneurysm and pt states this is the first time he has heard of this before. CT surgery, Dr. Roberson, consulted for incidental aortic root aneurysm. Of note, patient also found to be COVID positive and saturating 100% on RA.    Plan:  Problem 1: Aortic Aneurysm   - Incidental finding of 4.1cm aortic aneurysm on CT today  - Patient no prior known hx of aneurysm  - asymptomatic  - Given size and asymptomatic nature of aneurysm, instructed patient to follow up with Dr. Roberson in the office for routine surveillance of aortic aneurysm  - Gave patient Dr. Roberson's business card and make outpatient office aware. Will contact patient for telehealth visit early next week.   - Case discussed with Dr. Roberson     Problem 2: gastric ulcer  - recent admission for ulcer  - s/p H pylori treatment  - Needs to follow up with outpatient GI regarding gastric ulcer     Problem 3: COVID  - patient found to be COVID positive  - symptoms do not warrant hospitalization  - patient should self-isolate at home for 14 days     Problem 4: Vomiting  - likely 2/2 to either COVID or gastric ulcer  - Patient should remain well hydrated and monitor symptoms  - If symptoms worsen, patient should return to ED     Case discussed with attending (Dr. Roberson) and primary team.

## 2021-02-05 NOTE — CONSULT NOTE ADULT - SUBJECTIVE AND OBJECTIVE BOX
Surgeon: Dr. Roberson     Requesting Physician: Dr. Richardson (ED Attending)     HISTORY OF PRESENT ILLNESS:   This is a 59 y/o M with a PMHX of gastric ulcer (recent admission Dec 2020, nonbleeding, completed H. Pylori course 3 weeks ago) who states he has no other past medical history. Presented to [facility] ED today on 2/5/21 complaining of sharp periumbilical pain "similar to pain in december" that began at 2:00 am this morning that awoke him from sleep with 1 episode of non-bloody, non-billious vomit. States he has normal BMs and denies any BRBPR, dark stools. Patient denies any CP, palpitations, radiation of pain to the back, arm or neck, SOB, wheezing, nausea, diarrhea, constipation, fevers or chills. In the ED, patient underwent CT of the chest which revealed incidental 4.1cm aortic aneurysm and pt states this is the first time he has heard of this before. CT surgery, Dr. Roberson, consulted for incidental aortic root aneurysm. Of note, patient also found to be COVID positive and saturating 100% on RA. Denies any CP, palpitations, SOB, wheezing, abd pain, n/v/d/c, fevers or chills.     Of note: patient denies any family history of cardiac disease or sudden cardiac death.     PAST MEDICAL & SURGICAL HISTORY:  Prostate cancer  S/P appendectomy    MEDICATIONS  (STANDING):    MEDICATIONS  (PRN):    Allergies  No Known Allergies  Intolerances    SOCIAL HISTORY:  Smoker:  recently quit smoking, on nicotine patches   ETOH use:  denies  Ilicit Drug use:  denies    FAMILY HISTORY:  FH: gastric cancer    Review of Systems:  CONSTITUTIONAL: Denies fevers / chills, sweats, fatigue, weight loss, weight gain                                       NEURO:  Denies parathesias, seizures, syncope, confusion                                                                                  EYES:  Denies blurry vision, discharge, pain, loss of vision                                                                                    ENMT:  Denies difficulty hearing, vertigo, dysphagia, epistaxis, recent dental work                                       CV:  Denies chest pain, palpitations, VAZQUEZ, orthopnea                                                                                           RESPIRATORY:  Denies wheezing, SOB, cough / sputum, hemoptysis                                                               GI:  +abdominal pain, Denies nausea, vomiting, diarrhea, constipation, melena                                                                          : Denies hematuria, dysuria, urgency, incontinence                                                                                          MUSKULOSKELETAL:  Denies arthritis, joint swelling, muscle weakness                                                             SKIN/BREAST:  Denies rash, itching, hair loss, masses                                                                                              PSYCH:  Denies depression, anxiety, suicidal ideation                                                                                                HEME/LYMPH:  Denies bruises easily, enlarged lymph nodes, tender lymph nodes                                          ENDOCRINE:  Denies cold intolerance, heat intolerance, polydipsia                                                                      Vital Signs Last 24 Hrs  T(C): 36.7 (05 Feb 2021 05:55), Max: 36.7 (05 Feb 2021 05:55)  T(F): 98.1 (05 Feb 2021 05:55), Max: 98.1 (05 Feb 2021 05:55)  HR: 56 (05 Feb 2021 08:50) (56 - 80)  BP: 134/75 (05 Feb 2021 06:51) (121/77 - 134/75)  BP(mean): --  RR: 18 (05 Feb 2021 06:51) (17 - 18)  SpO2: 97% (05 Feb 2021 06:51) (95% - 97%)    PHYSICAL EXAM:  General: well appearing sitting in stretcher  in NAD   HEENT: crusting circumfrential around eyes and eyelids bilaterally. Normocephalic, atraumatic, PERRL   Neurological: AOx3. Motor skills grossly intact  Cardiovascular: Normal S1/S2. Regular rate/rhythm. No murmurs  Respiratory: Lungs CTA bilaterally. No wheezing or rales  Gastrointestinal: +BS in all 4 quadrants. Non-distended. Soft. Non-tender  Extremities: Strength 5/5 b/l upper/lower extremities. Sensation grossly intact upper/lower extremities. No edema. No calf tenderness.  Vascular: Radial 2+bilaterally, DP 2+ b/l  Incision Sites: none                                                             LABS:                        13.8   12.72 )-----------( 426      ( 05 Feb 2021 06:43 )             41.9     02-05    143  |  100  |  14  ----------------------------<  97  4.2   |  24  |  0.96    Ca    9.6      05 Feb 2021 06:43    TPro  8.1  /  Alb  4.0  /  TBili  1.2  /  DBili  x   /  AST  18  /  ALT  10  /  AlkPhos  98  02-05    PT/INR - ( 05 Feb 2021 06:43 )   PT: 14.3 sec;   INR: 1.20          PTT - ( 05 Feb 2021 06:43 )  PTT:27.1 sec    CARDIAC MARKERS ( 05 Feb 2021 06:43 )  x     / <0.01 ng/mL / x     / x     / x            RADIOLOGY & ADDITIONAL STUDIES:  < from: CT Angio Cardiac w/ IV Cont (02.05.21 @ 10:21) >  IMPRESSION:  1. Aneurysmal dilatation of the ascending segment of the thoracic aorta measuring 4.1 cm.  2. Mild large airways inflammation characterized by mild bronchial wall thickening. No significant air-trapping.  < end of copied text >

## 2021-02-05 NOTE — ED PROVIDER NOTE - PATIENT PORTAL LINK FT
You can access the FollowMyHealth Patient Portal offered by [facility] by registering at the following website: [email or web address]. By joining [employer]’s FollowMyHealth portal, you will also be able to view your health information using other applications (apps) compatible with our system.

## 2021-02-05 NOTE — ED PROVIDER NOTE - PHYSICAL EXAMINATION
CONSTITUTIONAL: Thin male in NAD.  SKIN: Normal color and turgor.    HEAD: NC/AT.  EYES: Conjunctiva clear. EOMI. PERRL.    ENT: Airway patent, OP without erythema, tonsillar swelling or exudate; uvula midline without swelling. Nasal mucosa clear, no rhinorrhea.   RESPIRATORY:  Breathing non-labored. No retractions or accessory muscle use.  Lungs CTA bilat.  CARDIOVASCULAR:  RRR, S1S2. No M/R/G.      GI:  Abdomen soft, nontender. No G/R.   MSK: Neck supple.  No lower extremity edema or calf tenderness.  No joint swelling or ROM limitation.  NEURO: Alert and oriented; CN II-XII grossly intact. Speech clear. 5/5 strength in all extremities.  Good balance. Steady gait.

## 2021-02-05 NOTE — ED PROVIDER NOTE - CLINICAL SUMMARY MEDICAL DECISION MAKING FREE TEXT BOX
Abdominal pain similar to recent hospitalization for gastric ulcer; has no symptoms to suggest acute GIB.  HD stable with normal Hgb.  Chest pain, may be GI in nature; cardiac risk factors smoking (quit in recent days) and age.  EKG similar to prior with possible Q waves and NSTA.  Never had stress test. First trop neg.  Will get CTA coronaries.  Pain improved with GI meds.  Never followed up with surgery ([facility]) or GI - if DC home he will need to take PPI and have GI/surgery follow up.

## 2023-12-09 NOTE — ED PROVIDER NOTE - NEUROLOGICAL, MLM
As per HPI otherwise reviewed and negative x10
Alert and oriented, no focal deficits, no motor or sensory deficits.

## 2024-12-06 NOTE — ED ADULT TRIAGE NOTE - RESPIRATORY RATE (BREATHS/MIN)
Message sent to patient via [employer] with input from Dr. Mart. Medication list updated.     Will plan to follow up with patient again on Monday.   16

## 2025-01-02 NOTE — PATIENT PROFILE ADULT - FUNCTIONAL SCREEN CURRENT LEVEL: COMMUNICATION, MLM
Referral sent to CHW via team PES Chat    CHW - Case Closure    This Community Health Worker spoke to patient via telephone today.     Pt/Caregiver reported: Referral for transportation     CHW spoke with patient and asked pt if she has free NEMT with Medicare A and B plan    Pt stated she doesn't think they offer transportation as she's call them before and they don't cover it    CHW asked pt if [place] [facility] plan has free NEMT pt stated she doesn't think and think it's only for veterans who served not there family members and may only provide transportation to the VA or VA location      CHW informed pt about [employer] it's a curb to curb service cost about 2 or 3 dollars to ride pt stated she'll think about it    Because her adult son is looking for a job doesn't know if he will find one by next month but if he doesn't pt stated her son will bring her to the [facility]          Pt/Caregiver denied any additional needs at this time and agrees with episode closure at this time.  Provided patient with Community Health Worker's contact information and encouraged him/her to contact this Community Health Worker if additional needs arise.          0 = understands/communicates without difficulty